# Patient Record
Sex: MALE | Race: WHITE | Employment: OTHER | URBAN - METROPOLITAN AREA
[De-identification: names, ages, dates, MRNs, and addresses within clinical notes are randomized per-mention and may not be internally consistent; named-entity substitution may affect disease eponyms.]

---

## 2017-01-01 ENCOUNTER — RADIANT APPOINTMENT (OUTPATIENT)
Dept: GENERAL RADIOLOGY | Facility: CLINIC | Age: 82
End: 2017-01-01
Attending: FAMILY MEDICINE
Payer: COMMERCIAL

## 2017-01-01 ENCOUNTER — OFFICE VISIT (OUTPATIENT)
Dept: URGENT CARE | Facility: URGENT CARE | Age: 82
End: 2017-01-01
Payer: COMMERCIAL

## 2017-01-01 VITALS
BODY MASS INDEX: 34.98 KG/M2 | HEART RATE: 81 BPM | SYSTOLIC BLOOD PRESSURE: 124 MMHG | OXYGEN SATURATION: 95 % | WEIGHT: 230 LBS | TEMPERATURE: 97.8 F | DIASTOLIC BLOOD PRESSURE: 88 MMHG

## 2017-01-01 DIAGNOSIS — T17.208A FOREIGN BODY IN PHARYNX, INITIAL ENCOUNTER: ICD-10-CM

## 2017-01-01 DIAGNOSIS — K52.9 GASTROENTERITIS: ICD-10-CM

## 2017-01-01 DIAGNOSIS — T17.208A FOREIGN BODY IN PHARYNX, INITIAL ENCOUNTER: Primary | ICD-10-CM

## 2017-01-01 PROCEDURE — 70360 X-RAY EXAM OF NECK: CPT

## 2017-01-01 PROCEDURE — 99213 OFFICE O/P EST LOW 20 MIN: CPT | Performed by: FAMILY MEDICINE

## 2017-01-01 PROCEDURE — 71020 XR CHEST 2 VW: CPT

## 2017-01-01 NOTE — NURSING NOTE
"Chief Complaint   Patient presents with     Throat Problem     pst calcium pill       Initial /88 mmHg  Pulse 81  Temp(Src) 97.8  F (36.6  C)  Wt 230 lb (104.327 kg)  SpO2 95% Estimated body mass index is 34.98 kg/(m^2) as calculated from the following:    Height as of 12/14/16: 5' 8\" (1.727 m).    Weight as of this encounter: 230 lb (104.327 kg).  BP completed using cuff size: regular    "

## 2017-01-01 NOTE — PROGRESS NOTES
"   SUBJECTIVE:  Chief Complaint   Patient presents with     Throat Problem      calcium pill thinks may have gotten \"stuck\"     Nausea     diarrhea     Martir Morris is a 89 year old male   with a chief complaint of feeling like a calcium pill is stuck in his  throat.  Onset of symptoms was 8 hours(s) ago.  He was drinking water without difficulty,  Ate spaghetti with meatballs at lunch with no difficulty swallowing.  Later in the afternoon he again felt like something was in his throat       Current and Associated symptoms:  Today he became nauseated and vomited 1 x ,  Then diarrhea 2x,  Treatment measures tried include None tried.  Predisposing factors include None.-  No new meds, no others with diarrhea illness, no travel or bad food    Past Medical History   Diagnosis Date     Hypertension        ALLERGIES:  Percocet      Current Outpatient Prescriptions on File Prior to Visit:  hyoscyamine (ANASPAZ/LEVSIN) 0.125 MG tablet Take 1-2 tablets (125-250 mcg) by mouth every 4 hours as needed for cramping   Calcium Carbonate-Vitamin D (CALCIUM + D PO)    POTASSIUM PO Take  by mouth.   CALCIUM PO Take  by mouth.   FUROSEMIDE PO Take  by mouth.   Cholecalciferol (VITAMIN D) 1000 UNITS capsule Take 1 capsule by mouth daily.   PLAVIX 75 MG OR TABS 1 TABLET DAILY   RANITIDINE 75 MG OR TABS 1 TABLET 1 TO 2 TIMES A DAY AS NEEDED   TOPROL XL OR 1 TABLET DAILY     No current facility-administered medications on file prior to visit.    Social History   Substance Use Topics     Smoking status: Former Smoker     Quit date: 09/11/1981     Smokeless tobacco: Not on file     Alcohol Use: No       No family history on file.      ROS:  INTEGUMENTARY/SKIN: NEGATIVE for worrisome rashes, moles or lesions  EYES: NEGATIVE for vision changes or irritation  RESP:NEGATIVE for significant cough or SOB  GI: NEGATIVE for nausea, abdominal pain, heartburn, or change in bowel habits    OBJECTIVE:   /88 mmHg  Pulse 81  Temp(Src) 97.8  F " (36.6  C)  Wt 230 lb (104.327 kg)  SpO2 95%  GENERAL APPEARANCE: healthy, alert and no distress,EYES: EOMI,  PERRL, conjunctiva clear,HENT: ear canals and TM's normal.  Nose normal.  Pharynx erythematous with some exudate noted.,NECK: supple, non-tender to palpation, no adenopathy noted,RESP: lungs clear to auscultation - no rales, rhonchi or wheezes,CV: regular rates and rhythm, normal S1 S2, no murmer noted,ABDOMEN:  soft, nontender, no HSM or masses and bowel sounds normal,SKIN: no suspicious lesions or rashes  X-ray--  No foreign body on neck or chest film- calcium pill should be visible    ASSESSMENT:  Foreign body in pharynx, initial encounter    No foreign body identified,  Likely he swallowed the pill and has a scratch in his pharynx that is causing persistent irritation  - XR Chest 2 Views; Future  - XR Neck Soft Tissue; Future    Gastroenteritis  Symptoms seem mild,  Encourage fluids,  rest

## 2017-12-05 ENCOUNTER — RADIANT APPOINTMENT (OUTPATIENT)
Dept: GENERAL RADIOLOGY | Facility: CLINIC | Age: 82
End: 2017-12-05
Attending: PHYSICIAN ASSISTANT
Payer: COMMERCIAL

## 2017-12-05 ENCOUNTER — OFFICE VISIT (OUTPATIENT)
Dept: URGENT CARE | Facility: URGENT CARE | Age: 82
End: 2017-12-05
Payer: COMMERCIAL

## 2017-12-05 VITALS
SYSTOLIC BLOOD PRESSURE: 125 MMHG | RESPIRATION RATE: 16 BRPM | TEMPERATURE: 97 F | BODY MASS INDEX: 35.28 KG/M2 | DIASTOLIC BLOOD PRESSURE: 75 MMHG | WEIGHT: 232 LBS | OXYGEN SATURATION: 94 % | HEART RATE: 82 BPM

## 2017-12-05 DIAGNOSIS — S99.922A TOE INJURY, LEFT, INITIAL ENCOUNTER: Primary | ICD-10-CM

## 2017-12-05 DIAGNOSIS — S99.922A TOE INJURY, LEFT, INITIAL ENCOUNTER: ICD-10-CM

## 2017-12-05 PROCEDURE — 99213 OFFICE O/P EST LOW 20 MIN: CPT | Performed by: PHYSICIAN ASSISTANT

## 2017-12-05 PROCEDURE — 73660 X-RAY EXAM OF TOE(S): CPT | Mod: LT

## 2017-12-05 RX ORDER — BUPROPION HYDROCHLORIDE 200 MG/1
200 TABLET, EXTENDED RELEASE ORAL DAILY
COMMUNITY

## 2017-12-05 RX ORDER — ATORVASTATIN CALCIUM 10 MG/1
10 TABLET, FILM COATED ORAL
COMMUNITY
Start: 2017-09-15

## 2017-12-05 RX ORDER — AMLODIPINE BESYLATE 5 MG/1
5 TABLET ORAL
COMMUNITY
Start: 2017-04-12 | End: 2019-06-07

## 2017-12-05 RX ORDER — METOPROLOL SUCCINATE 50 MG/1
TABLET, EXTENDED RELEASE ORAL
COMMUNITY
Start: 2017-06-15 | End: 2019-06-07

## 2017-12-05 NOTE — MR AVS SNAPSHOT
"              After Visit Summary   2017    Martir Morris    MRN: [id number]           Patient Information     Date Of Birth          1925        Visit Information        Provider Department      2017 8:10 PM Melany Liu PA-C [facility] [place] [facility]        Today's Diagnoses     Toe injury, left, initial encounter    -  1       Follow-ups after your visit        Who to contact     If you have questions or need follow up information about today's clinic visit or your schedule please contact [facility] directly at [phone].  Normal or non-critical lab and imaging results will be communicated to you by [employer], letter or phone within 4 business days after the clinic has received the results. If you do not hear from us within 7 days, please contact the clinic through [employer] or phone. If you have a critical or abnormal lab result, we will notify you by phone as soon as possible.  Submit refill requests through [employer] or call your pharmacy and they will forward the refill request to us. Please allow 3 business days for your refill to be completed.          Additional Information About Your Visit        MyChart Information     [employer] lets you send messages to your doctor, view your test results, renew your prescriptions, schedule appointments and more. To sign up, go to [email or web address] . Click on \"Log in\" on the left side of the screen, which will take you to the Welcome page. Then click on \"Sign up Now\" on the right side of the page.     You will be asked to enter the access code listed below, as well as some personal information. Please follow the directions to create your username and password.     Your access code is: [email or web address]  Expires: 3/7/2018  4:43 PM     Your access code will  in 90 days. If you need help or a new code, please call your [facility] clinic or [phone].        Care EveryWhere ID     This is your Care " EveryWhere ID. This could be used by other organizations to access your [facility] medical records  [id number]        Your Vitals Were     Pulse Temperature Respirations Pulse Oximetry BMI (Body Mass Index)       82 97  F (36.1  C) (Oral) 16 94% 35.28 kg/m2        Blood Pressure from Last 3 Encounters:   12/05/17 125/75   01/01/17 124/88   12/14/16 126/82    Weight from Last 3 Encounters:   12/05/17 232 lb (105.2 kg)   01/01/17 230 lb (104.3 kg)   12/14/16 241 lb 6.4 oz (109.5 kg)               Primary Care Provider Fax #    Physician No Ref-Primary [phone]       No address on file        Equal Access to Services     SANDIP SERRANO : Angel claroso Hawa, waaxda luqadaha, qaybta kaalmada adeegyada, ok adamson . So [facility] [phone].    ATENCIÓN: Si habla español, tiene a mcneil disposición servicios gratuitos de asistencia lingüística. Llame al [phone].    We comply with applicable federal civil rights laws and [place] laws. We do not discriminate on the basis of race, color, national origin, age, disability, sex, sexual orientation, or gender identity.            Thank you!     Thank you for choosing [facility]  for your care. Our goal is always to provide you with excellent care. Hearing back from our patients is one way we can continue to improve our services. Please take a few minutes to complete the written survey that you may receive in the mail after your visit with us. Thank you!             Your Updated Medication List - Protect others around you: Learn how to safely use, store and throw away your medicines at www.disposemymeds.org.          This list is accurate as of: 12/5/17 11:59 PM.  Always use your most recent med list.                   Brand Name Dispense Instructions for use Diagnosis    amLODIPine 5 MG tablet    NORVASC     Take 5 mg by mouth        atorvastatin 10 MG tablet    LIPITOR     Take 10 mg by mouth         buPROPion 200 MG 12 hr tablet    WELLBUTRIN SR     Take 200 mg by mouth        CALCIUM + D PO           CALCIUM PO      Take  by mouth.        FUROSEMIDE PO      Take  by mouth.        hyoscyamine 0.125 MG tablet    ANASPAZ/LEVSIN    15 tablet    Take 1-2 tablets (125-250 mcg) by mouth every 4 hours as needed for cramping    Abdominal pain, generalized       leuprolide 30 MG kit    LUPRON DEPOT     Inject 30 mg into the muscle        PLAVIX 75 MG tablet   Generic drug:  clopidogrel      1 TABLET DAILY        POTASSIUM PO      Take  by mouth.        * RANITIDINE 75 MG Tabs      1 TABLET 1 TO 2 TIMES A DAY AS NEEDED        * ranitidine 150 MG tablet    ZANTAC     Take 150 mg by mouth        * TOPROL XL PO      1 TABLET DAILY        * metoprolol 50 MG 24 hr tablet    TOPROL-XL     TAKE ONE TABLET BY MOUTH EVERY DAY        vitamin D 1000 UNITS capsule      Take 1 capsule by mouth daily.        * Notice:  This list has 4 medication(s) that are the same as other medications prescribed for you. Read the directions carefully, and ask your doctor or other care provider to review them with you.

## 2017-12-06 NOTE — NURSING NOTE
"Chief Complaint   Patient presents with     Urgent Care     pt states left foot toe nail x this morning        Initial /75 (BP Location: Left arm, Patient Position: Chair, Cuff Size: Adult Regular)  Pulse 82  Temp 97  F (36.1  C) (Oral)  Resp 16  Wt 232 lb (105.2 kg)  SpO2 94%  BMI 35.28 kg/m2 Estimated body mass index is 35.28 kg/(m^2) as calculated from the following:    Height as of 12/14/16: 5' 8\" (1.727 m).    Weight as of this encounter: 232 lb (105.2 kg).  Medication Reconciliation: complete      "

## 2017-12-07 NOTE — PROGRESS NOTES
SUBJECTIVE:  Chief Complaint   Patient presents with     Urgent Care     pt states left foot toe nail x this morning      Martir Morris is a 89 year old male presents with a chief complaint of left 3rd toe pain after stubbing toe today.  No other symptoms or injury.   :[id number]}.  He treated it initially with no therapy. This is the first time this type of injury has occurred to this patient.     Past Medical History:   Diagnosis Date     Hypertension      Patient Active Problem List   Diagnosis     Aftercare following joint replacement     Knee joint replacement by other means     Social History   Substance Use Topics     Smoking status: Former Smoker     Quit date: 9/11/1981     Smokeless tobacco: Not on file     Alcohol use No       ROS:  CONSTITUTIONAL:NEGATIVE for fever, chills, change in weight  INTEGUMENTARY/SKIN: NEGATIVE for worrisome rashes, moles or lesions  MUSCULOSKELETAL: as per HPI  NEURO: NEGATIVE for weakness, dizziness or paresthesias    EXAM:   /75 (BP Location: Left arm, Patient Position: Chair, Cuff Size: Adult Regular)  Pulse 82  Temp 97  F (36.1  C) (Oral)  Resp 16  Wt 232 lb (105.2 kg)  SpO2 94%  BMI 35.28 kg/m2  Gen: healthy,alert,no distress  Extremity: left 3rd toe: ecchymosis at base of toe with tenderness.  No open wounds or bony deformity.   SKIN: no rashes or lesions.    Toenails are thick and long.       XRAY of toe is negative for fracture as per my interpretation     (S99.925Y) Toe injury, left, initial encounter  (primary encounter diagnosis)  Comment:   Plan: XR Toe Left G/E 2 Views        Brandon tape toes 2 and 3 for a few days.  Ice, elevate.  Tylenol prn.     Advised f/u with podiatry to address long toenails.      Patient expresses understanding and agreement with the assessment and plan as above.

## 2018-08-14 ENCOUNTER — OFFICE VISIT (OUTPATIENT)
Dept: URGENT CARE | Facility: URGENT CARE | Age: 83
End: 2018-08-14
Payer: COMMERCIAL

## 2018-08-14 VITALS
RESPIRATION RATE: 14 BRPM | OXYGEN SATURATION: 95 % | BODY MASS INDEX: 34.53 KG/M2 | DIASTOLIC BLOOD PRESSURE: 82 MMHG | SYSTOLIC BLOOD PRESSURE: 130 MMHG | HEART RATE: 76 BPM | TEMPERATURE: 98.1 F | WEIGHT: 227.1 LBS

## 2018-08-14 DIAGNOSIS — H10.31 ACUTE BACTERIAL CONJUNCTIVITIS OF RIGHT EYE: Primary | ICD-10-CM

## 2018-08-14 PROCEDURE — 99213 OFFICE O/P EST LOW 20 MIN: CPT | Performed by: PHYSICIAN ASSISTANT

## 2018-08-14 RX ORDER — ERYTHROMYCIN 5 MG/G
OINTMENT OPHTHALMIC
Qty: 3.5 G | Refills: 0 | Status: SHIPPED | OUTPATIENT
Start: 2018-08-14 | End: 2019-06-07

## 2018-08-14 NOTE — PATIENT INSTRUCTIONS
(H10.31) Acute bacterial conjunctivitis of right eye  (primary encounter diagnosis)  Comment:   Plan: erythromycin (ROMYCIN) ophthalmic ointment  Use small amount (about 7mm in diameter) to each eye 3 times a day for 5 days        Warm compress to eyes prior to using ointment    Follow up with ophthalmology should symptoms persist or worsen.

## 2018-08-14 NOTE — MR AVS SNAPSHOT
After Visit Summary   8/14/2018    Martir Morris    MRN: [id number]           Patient Information     Date Of Birth          9/27/1925        Visit Information        Provider Department      8/14/2018 1:20 PM Melany Liu PA-C [facility]        Today's Diagnoses     Acute bacterial conjunctivitis of right eye    -  1      Care Instructions    (H10.31) Acute bacterial conjunctivitis of right eye  (primary encounter diagnosis)  Comment:   Plan: erythromycin (ROMYCIN) ophthalmic ointment  Use small amount (about 7mm in diameter) to each eye 3 times a day for 5 days        Warm compress to eyes prior to using ointment    Follow up with ophthalmology should symptoms persist or worsen.            Follow-ups after your visit        Who to contact     If you have questions or need follow up information about today's clinic visit or your schedule please contact [facility] directly at [phone].  Normal or non-critical lab and imaging results will be communicated to you by MyChart, letter or phone within 4 business days after the clinic has received the results. If you do not hear from us within 7 days, please contact the clinic through MyChart or phone. If you have a critical or abnormal lab result, we will notify you by phone as soon as possible.  Submit refill requests through [employer] or call your pharmacy and they will forward the refill request to us. Please allow 3 business days for your refill to be completed.          Additional Information About Your Visit        Care EveryWhere ID     This is your Care EveryWhere ID. This could be used by other organizations to access your [facility] medical records  [id number]        Your Vitals Were     Pulse Temperature Respirations Pulse Oximetry BMI (Body Mass Index)       76 98.1  F (36.7  C) (Oral) 14 95% 34.53 kg/m2        Blood Pressure from Last 3 Encounters:   08/14/18 130/82    12/05/17 125/75   01/01/17 124/88    Weight from Last 3 Encounters:   08/14/18 227 lb 1.6 oz (103 kg)   12/05/17 232 lb (105.2 kg)   01/01/17 230 lb (104.3 kg)              Today, you had the following     No orders found for display         Today's Medication Changes          These changes are accurate as of 8/14/18  3:01 PM.  If you have any questions, ask your nurse or doctor.               Start taking these medicines.        Dose/Directions    erythromycin ophthalmic ointment   Commonly known as:  ROMYCIN   Used for:  Acute bacterial conjunctivitis of right eye   Started by:  Melany Liu PA-C        Apply to affected eye(s) TID as directed for 5 days   Quantity:  3.5 g   Refills:  0            Where to get your medicines      These medications were sent to [facility] [address]  [address], [facility] [address]     Phone:  [phone]     erythromycin ophthalmic ointment                Primary Care Provider Fax #    Physician No Ref-Primary [phone]       No address on file        Equal Access to Services     WILLEM SERRANO : Hadii apple reynoso hadasho Soqamarali, waaxda luqadaha, qaybta kaalmada adekatieyada, ok adamson . So [facility] [phone].    ATENCIÓN: Si habla español, tiene a mcneil disposición servicios gratuitos de asistencia lingüística. Llame al [phone].    We comply with applicable federal civil rights laws and [place] laws. We do not discriminate on the basis of race, color, national origin, age, disability, sex, sexual orientation, or gender identity.            Thank you!     Thank you for choosing [facility]  for your care. Our goal is always to provide you with excellent care. Hearing back from our patients is one way we can continue to improve our services. Please take a few minutes to complete the written survey that you may receive in the mail after your visit with us.  Thank you!             Your Updated Medication List - Protect others around you: Learn how to safely use, store and throw away your medicines at www.disposemymeds.org.          This list is accurate as of 8/14/18  3:01 PM.  Always use your most recent med list.                   Brand Name Dispense Instructions for use Diagnosis    amLODIPine 5 MG tablet    NORVASC     Take 5 mg by mouth        atorvastatin 10 MG tablet    LIPITOR     Take 10 mg by mouth        buPROPion 200 MG 12 hr tablet    WELLBUTRIN SR     Take 200 mg by mouth        CALCIUM + D PO           CALCIUM PO      Take  by mouth.        erythromycin ophthalmic ointment    ROMYCIN    3.5 g    Apply to affected eye(s) TID as directed for 5 days    Acute bacterial conjunctivitis of right eye       FUROSEMIDE PO      Take  by mouth.        hyoscyamine 0.125 MG tablet    ANASPAZ/LEVSIN    15 tablet    Take 1-2 tablets (125-250 mcg) by mouth every 4 hours as needed for cramping    Abdominal pain, generalized       leuprolide 30 MG kit    LUPRON DEPOT     Inject 30 mg into the muscle        PLAVIX 75 MG tablet   Generic drug:  clopidogrel      1 TABLET DAILY        POTASSIUM PO      Take  by mouth.        * RANITIDINE 75 MG Tabs      1 TABLET 1 TO 2 TIMES A DAY AS NEEDED        * ranitidine 150 MG tablet    ZANTAC     Take 150 mg by mouth        * TOPROL XL PO      1 TABLET DAILY        * metoprolol succinate 50 MG 24 hr tablet    TOPROL-XL     TAKE ONE TABLET BY MOUTH EVERY DAY        vitamin D 1000 units capsule      Take 1 capsule by mouth daily.        * Notice:  This list has 4 medication(s) that are the same as other medications prescribed for you. Read the directions carefully, and ask your doctor or other care provider to review them with you.

## 2018-08-17 NOTE — PROGRESS NOTES
SUBJECTIVE:  Chief Complaint:   Chief Complaint   Patient presents with     Urgent Care     Eye Problem     Pt states right watery and red sxs x this morning      History of Present Illness:  Martir Morris is a 89 year old male who presents complaining of mild right eye discharge, redness since this morning.  Denies any trauma.  Denies any eye pain or decreased vision.  Eye has been goopy.  Left eye is starting to develop symptoms as well.   Denies any fevers or rashes,    He is otherwise in his usual state of health.      SH: patient is a retired       Past Medical History:   Diagnosis Date     Hypertension      Current Outpatient Prescriptions   Medication Sig Dispense Refill     atorvastatin (LIPITOR) 10 MG tablet Take 10 mg by mouth       buPROPion (WELLBUTRIN SR) 200 MG 12 hr tablet Take 200 mg by mouth       Calcium Carbonate-Vitamin D (CALCIUM + D PO)        CALCIUM PO Take  by mouth.       Cholecalciferol (VITAMIN D) 1000 UNITS capsule Take 1 capsule by mouth daily.       erythromycin (ROMYCIN) ophthalmic ointment Apply to affected eye(s) TID as directed for 5 days 3.5 g 0     FUROSEMIDE PO Take  by mouth.       hyoscyamine (ANASPAZ/LEVSIN) 0.125 MG tablet Take 1-2 tablets (125-250 mcg) by mouth every 4 hours as needed for cramping 15 tablet 1     metoprolol (TOPROL-XL) 50 MG 24 hr tablet TAKE ONE TABLET BY MOUTH EVERY DAY       PLAVIX 75 MG OR TABS 1 TABLET DAILY       POTASSIUM PO Take  by mouth.       ranitidine (ZANTAC) 150 MG tablet Take 150 mg by mouth       RANITIDINE 75 MG OR TABS 1 TABLET 1 TO 2 TIMES A DAY AS NEEDED       TOPROL XL OR 1 TABLET DAILY       amLODIPine (NORVASC) 5 MG tablet Take 5 mg by mouth       leuprolide (LUPRON DEPOT) 30 MG kit Inject 30 mg into the muscle          ROS:  CONSTITUTIONAL:NEGATIVE for fever, chills, change in weight  INTEGUMENTARY/SKIN: NEGATIVE for worrisome rashes, moles or lesions  EYES: as per HPI  ENT/MOUTH: NEGATIVE for ear, mouth and throat  problems  RESP:NEGATIVE for significant cough or SOB  CV: NEGATIVE for chest pain, palpitations or peripheral edema  Review of systems negative except as stated above.    OBJECTIVE:  /82  Pulse 76  Temp 98.1  F (36.7  C) (Oral)  Resp 14  Wt 227 lb 1.6 oz (103 kg)  SpO2 95%  BMI 34.53 kg/m2  General: no acute distress  Eye exam: right eye normal lid, cornea, pupil and fundus.  Conjunctiva is injected with yellow drainage.    left eye normal lid, conjunctiva, cornea, pupil and fundus.  Nose: NORMAL - no drainage, turbinates normal in size.  SKIN: no rashes or lesions.      (H10.31) Acute bacterial conjunctivitis of right eye  (primary encounter diagnosis)  Comment:   Plan: erythromycin (ROMYCIN) ophthalmic ointment  Use small amount (about 7mm in diameter) to each eye 3 times a day for 5 days        Warm compress to eyes prior to using ointment    Follow up with ophthalmology should symptoms persist or worsen.      Patient expresses understanding and agreement with the assessment and plan as above.

## 2019-06-07 ENCOUNTER — HOSPITAL ENCOUNTER (OUTPATIENT)
Facility: CLINIC | Age: 84
Setting detail: OBSERVATION
Discharge: HOME | End: 2019-06-08
Attending: EMERGENCY MEDICINE | Admitting: INTERNAL MEDICINE
Payer: MEDICARE

## 2019-06-07 DIAGNOSIS — R04.0 EPISTAXIS: Primary | ICD-10-CM

## 2019-06-07 PROBLEM — R53.1 WEAKNESS: Status: ACTIVE | Noted: 2019-06-07

## 2019-06-07 LAB
ABO + RH BLD: NORMAL
ABO + RH BLD: NORMAL
BASOPHILS # BLD AUTO: 0 10E9/L (ref 0–0.2)
BASOPHILS NFR BLD AUTO: 0.5 %
BLD GP AB SCN SERPL QL: NORMAL
BLOOD BANK CMNT PATIENT-IMP: NORMAL
DIFFERENTIAL METHOD BLD: ABNORMAL
EOSINOPHIL # BLD AUTO: 0.2 10E9/L (ref 0–0.7)
EOSINOPHIL NFR BLD AUTO: 2.6 %
ERYTHROCYTE [DISTWIDTH] IN BLOOD BY AUTOMATED COUNT: 13.4 % (ref 10–15)
HCT VFR BLD AUTO: 43.2 % (ref 40–53)
HGB BLD-MCNC: 14.5 G/DL (ref 13.3–17.7)
IMM GRANULOCYTES # BLD: 0 10E9/L (ref 0–0.4)
IMM GRANULOCYTES NFR BLD: 0.2 %
LYMPHOCYTES # BLD AUTO: 1.1 10E9/L (ref 0.8–5.3)
LYMPHOCYTES NFR BLD AUTO: 18.5 %
MCH RBC QN AUTO: 33.6 PG (ref 26.5–33)
MCHC RBC AUTO-ENTMCNC: 33.6 G/DL (ref 31.5–36.5)
MCV RBC AUTO: 100 FL (ref 78–100)
MONOCYTES # BLD AUTO: 0.7 10E9/L (ref 0–1.3)
MONOCYTES NFR BLD AUTO: 11.6 %
NEUTROPHILS # BLD AUTO: 3.8 10E9/L (ref 1.6–8.3)
NEUTROPHILS NFR BLD AUTO: 66.6 %
NRBC # BLD AUTO: 0 10*3/UL
NRBC BLD AUTO-RTO: 0 /100
PLATELET # BLD AUTO: 200 10E9/L (ref 150–450)
RBC # BLD AUTO: 4.32 10E12/L (ref 4.4–5.9)
SPECIMEN EXP DATE BLD: NORMAL
WBC # BLD AUTO: 5.7 10E9/L (ref 4–11)

## 2019-06-07 PROCEDURE — 99220 ZZC INITIAL OBSERVATION CARE,LEVL III: CPT | Performed by: INTERNAL MEDICINE

## 2019-06-07 PROCEDURE — 86901 BLOOD TYPING SEROLOGIC RH(D): CPT | Performed by: EMERGENCY MEDICINE

## 2019-06-07 PROCEDURE — G0378 HOSPITAL OBSERVATION PER HR: HCPCS

## 2019-06-07 PROCEDURE — 85025 COMPLETE CBC W/AUTO DIFF WBC: CPT | Performed by: EMERGENCY MEDICINE

## 2019-06-07 PROCEDURE — A9270 NON-COVERED ITEM OR SERVICE: HCPCS | Mod: GY | Performed by: INTERNAL MEDICINE

## 2019-06-07 PROCEDURE — 25800030 ZZH RX IP 258 OP 636: Performed by: INTERNAL MEDICINE

## 2019-06-07 PROCEDURE — 30903 CONTROL OF NOSEBLEED: CPT

## 2019-06-07 PROCEDURE — 86900 BLOOD TYPING SEROLOGIC ABO: CPT | Performed by: EMERGENCY MEDICINE

## 2019-06-07 PROCEDURE — 86850 RBC ANTIBODY SCREEN: CPT | Performed by: EMERGENCY MEDICINE

## 2019-06-07 PROCEDURE — 99285 EMERGENCY DEPT VISIT HI MDM: CPT | Mod: 25

## 2019-06-07 PROCEDURE — 25000132 ZZH RX MED GY IP 250 OP 250 PS 637: Mod: GY | Performed by: INTERNAL MEDICINE

## 2019-06-07 PROCEDURE — 85025 COMPLETE CBC W/AUTO DIFF WBC: CPT | Performed by: INTERNAL MEDICINE

## 2019-06-07 RX ORDER — ONDANSETRON 2 MG/ML
4 INJECTION INTRAMUSCULAR; INTRAVENOUS EVERY 6 HOURS PRN
Status: DISCONTINUED | OUTPATIENT
Start: 2019-06-07 | End: 2019-06-08 | Stop reason: HOSPADM

## 2019-06-07 RX ORDER — AMOXICILLIN 250 MG
1 CAPSULE ORAL 2 TIMES DAILY PRN
Status: DISCONTINUED | OUTPATIENT
Start: 2019-06-07 | End: 2019-06-08 | Stop reason: HOSPADM

## 2019-06-07 RX ORDER — ONDANSETRON 4 MG/1
4 TABLET, ORALLY DISINTEGRATING ORAL EVERY 6 HOURS PRN
Status: DISCONTINUED | OUTPATIENT
Start: 2019-06-07 | End: 2019-06-08 | Stop reason: HOSPADM

## 2019-06-07 RX ORDER — ACETAMINOPHEN 650 MG/1
650 SUPPOSITORY RECTAL EVERY 4 HOURS PRN
Status: DISCONTINUED | OUTPATIENT
Start: 2019-06-07 | End: 2019-06-08 | Stop reason: HOSPADM

## 2019-06-07 RX ORDER — BUPROPION HYDROCHLORIDE 200 MG/1
200 TABLET, EXTENDED RELEASE ORAL DAILY
Status: DISCONTINUED | OUTPATIENT
Start: 2019-06-08 | End: 2019-06-08 | Stop reason: HOSPADM

## 2019-06-07 RX ORDER — AMOXICILLIN 250 MG
2 CAPSULE ORAL 2 TIMES DAILY PRN
Status: DISCONTINUED | OUTPATIENT
Start: 2019-06-07 | End: 2019-06-08 | Stop reason: HOSPADM

## 2019-06-07 RX ORDER — FINASTERIDE 5 MG/1
5 TABLET, FILM COATED ORAL DAILY
Status: DISCONTINUED | OUTPATIENT
Start: 2019-06-08 | End: 2019-06-08 | Stop reason: HOSPADM

## 2019-06-07 RX ORDER — FINASTERIDE 5 MG/1
5 TABLET, FILM COATED ORAL DAILY
COMMUNITY

## 2019-06-07 RX ORDER — FERROUS SULFATE 325(65) MG
325 TABLET ORAL
COMMUNITY

## 2019-06-07 RX ORDER — PROCHLORPERAZINE 25 MG
12.5 SUPPOSITORY, RECTAL RECTAL EVERY 12 HOURS PRN
Status: DISCONTINUED | OUTPATIENT
Start: 2019-06-07 | End: 2019-06-08 | Stop reason: HOSPADM

## 2019-06-07 RX ORDER — SIMETHICONE 80 MG
80 TABLET,CHEWABLE ORAL 4 TIMES DAILY PRN
COMMUNITY

## 2019-06-07 RX ORDER — NALOXONE HYDROCHLORIDE 0.4 MG/ML
.1-.4 INJECTION, SOLUTION INTRAMUSCULAR; INTRAVENOUS; SUBCUTANEOUS
Status: DISCONTINUED | OUTPATIENT
Start: 2019-06-07 | End: 2019-06-08 | Stop reason: HOSPADM

## 2019-06-07 RX ORDER — ACETAMINOPHEN 325 MG/1
650 TABLET ORAL EVERY 4 HOURS PRN
Status: DISCONTINUED | OUTPATIENT
Start: 2019-06-07 | End: 2019-06-08 | Stop reason: HOSPADM

## 2019-06-07 RX ORDER — CLOPIDOGREL BISULFATE 75 MG/1
75 TABLET ORAL DAILY
Status: ON HOLD | COMMUNITY
End: 2019-06-08

## 2019-06-07 RX ORDER — METOPROLOL SUCCINATE 50 MG/1
50 TABLET, EXTENDED RELEASE ORAL DAILY
Status: DISCONTINUED | OUTPATIENT
Start: 2019-06-08 | End: 2019-06-08 | Stop reason: HOSPADM

## 2019-06-07 RX ORDER — FLUTICASONE PROPIONATE 50 MCG
1 SPRAY, SUSPENSION (ML) NASAL DAILY PRN
Status: ON HOLD | COMMUNITY
End: 2019-06-08

## 2019-06-07 RX ORDER — AMLODIPINE BESYLATE 5 MG/1
5 TABLET ORAL DAILY
Status: DISCONTINUED | OUTPATIENT
Start: 2019-06-08 | End: 2019-06-08 | Stop reason: HOSPADM

## 2019-06-07 RX ORDER — SODIUM CHLORIDE 9 MG/ML
INJECTION, SOLUTION INTRAVENOUS CONTINUOUS
Status: ACTIVE | OUTPATIENT
Start: 2019-06-07 | End: 2019-06-08

## 2019-06-07 RX ORDER — POLYETHYLENE GLYCOL 3350 17 G/17G
17 POWDER, FOR SOLUTION ORAL DAILY PRN
Status: DISCONTINUED | OUTPATIENT
Start: 2019-06-07 | End: 2019-06-08 | Stop reason: HOSPADM

## 2019-06-07 RX ORDER — AMLODIPINE BESYLATE 5 MG/1
5 TABLET ORAL DAILY
COMMUNITY

## 2019-06-07 RX ORDER — METOPROLOL SUCCINATE 50 MG/1
50 TABLET, EXTENDED RELEASE ORAL DAILY
COMMUNITY

## 2019-06-07 RX ORDER — PROCHLORPERAZINE MALEATE 5 MG
5 TABLET ORAL EVERY 6 HOURS PRN
Status: DISCONTINUED | OUTPATIENT
Start: 2019-06-07 | End: 2019-06-08 | Stop reason: HOSPADM

## 2019-06-07 RX ORDER — SODIUM CHLORIDE 9 MG/ML
INJECTION, SOLUTION INTRAVENOUS CONTINUOUS
Status: CANCELLED | OUTPATIENT
Start: 2019-06-07 | End: 2019-06-08

## 2019-06-07 RX ORDER — POTASSIUM CHLORIDE 750 MG/1
10 TABLET, EXTENDED RELEASE ORAL DAILY
COMMUNITY

## 2019-06-07 RX ADMIN — AMOXICILLIN AND CLAVULANATE POTASSIUM 1 TABLET: 875; 125 TABLET, FILM COATED ORAL at 23:04

## 2019-06-07 RX ADMIN — SODIUM CHLORIDE: 9 INJECTION, SOLUTION INTRAVENOUS at 22:19

## 2019-06-07 ASSESSMENT — MIFFLIN-ST. JEOR: SCORE: 1649.17

## 2019-06-07 ASSESSMENT — ENCOUNTER SYMPTOMS
WEAKNESS: 1
LIGHT-HEADEDNESS: 1

## 2019-06-07 NOTE — ED TRIAGE NOTES
Spontaneous nose bleed starting approx. 530pm. Pt reports having a nose bleed yesterday that he was able to control with afrin. Takes plavix.

## 2019-06-07 NOTE — ED NOTES
Bed: ED10  Expected date: 6/7/19  Expected time: 6:04 PM  Means of arrival: Ambulance  Comments:  533 89m nosebleed bleeding VSS

## 2019-06-08 ENCOUNTER — APPOINTMENT (OUTPATIENT)
Dept: PHYSICAL THERAPY | Facility: CLINIC | Age: 84
End: 2019-06-08
Attending: INTERNAL MEDICINE
Payer: MEDICARE

## 2019-06-08 VITALS
TEMPERATURE: 97.3 F | WEIGHT: 227 LBS | DIASTOLIC BLOOD PRESSURE: 77 MMHG | BODY MASS INDEX: 34.4 KG/M2 | RESPIRATION RATE: 16 BRPM | OXYGEN SATURATION: 93 % | SYSTOLIC BLOOD PRESSURE: 130 MMHG | HEART RATE: 72 BPM | HEIGHT: 68 IN

## 2019-06-08 LAB
ANION GAP SERPL CALCULATED.3IONS-SCNC: 5 MMOL/L (ref 3–14)
BASOPHILS # BLD AUTO: 0 10E9/L (ref 0–0.2)
BASOPHILS NFR BLD AUTO: 3 %
BUN SERPL-MCNC: 26 MG/DL (ref 7–30)
CALCIUM SERPL-MCNC: 9.1 MG/DL (ref 8.5–10.1)
CHLORIDE SERPL-SCNC: 109 MMOL/L (ref 94–109)
CO2 SERPL-SCNC: 27 MMOL/L (ref 20–32)
CREAT SERPL-MCNC: 1.16 MG/DL (ref 0.66–1.25)
DIFFERENTIAL METHOD BLD: ABNORMAL
EOSINOPHIL # BLD AUTO: 0.1 10E9/L (ref 0–0.7)
EOSINOPHIL NFR BLD AUTO: 2 %
ERYTHROCYTE [DISTWIDTH] IN BLOOD BY AUTOMATED COUNT: 13.5 % (ref 10–15)
GFR SERPL CREATININE-BSD FRML MDRD: 54 ML/MIN/{1.73_M2}
GLUCOSE SERPL-MCNC: 109 MG/DL (ref 70–99)
HCT VFR BLD AUTO: 41.1 % (ref 40–53)
HGB BLD-MCNC: 13.5 G/DL (ref 13.3–17.7)
HGB BLD-MCNC: 13.7 G/DL (ref 13.3–17.7)
LYMPHOCYTES # BLD AUTO: 1.2 10E9/L (ref 0.8–5.3)
LYMPHOCYTES NFR BLD AUTO: 20 %
MCH RBC QN AUTO: 33.2 PG (ref 26.5–33)
MCHC RBC AUTO-ENTMCNC: 32.8 G/DL (ref 31.5–36.5)
MCV RBC AUTO: 101 FL (ref 78–100)
MONOCYTES # BLD AUTO: 0.4 10E9/L (ref 0–1.3)
MONOCYTES NFR BLD AUTO: 7 %
NEUTROPHILS # BLD AUTO: 3.9 10E9/L (ref 1.6–8.3)
NEUTROPHILS NFR BLD AUTO: 68 %
NEUTS BAND # BLD AUTO: 0.2 10E9/L (ref 0–0.6)
NEUTS BAND NFR BLD MANUAL: 0 %
NRBC # BLD AUTO: 0.1 10*3/UL
NRBC BLD AUTO-RTO: 1 /100
PLATELET # BLD AUTO: 205 10E9/L (ref 150–450)
PLATELET # BLD EST: ABNORMAL 10*3/UL
POTASSIUM SERPL-SCNC: 4.6 MMOL/L (ref 3.4–5.3)
RBC # BLD AUTO: 4.07 10E12/L (ref 4.4–5.9)
RBC MORPH BLD: ABNORMAL
SODIUM SERPL-SCNC: 141 MMOL/L (ref 133–144)
WBC # BLD AUTO: 5.8 10E9/L (ref 4–11)

## 2019-06-08 PROCEDURE — 97161 PT EVAL LOW COMPLEX 20 MIN: CPT | Mod: GP

## 2019-06-08 PROCEDURE — 99217 ZZC OBSERVATION CARE DISCHARGE: CPT | Performed by: PHYSICIAN ASSISTANT

## 2019-06-08 PROCEDURE — A9270 NON-COVERED ITEM OR SERVICE: HCPCS | Mod: GY | Performed by: STUDENT IN AN ORGANIZED HEALTH CARE EDUCATION/TRAINING PROGRAM

## 2019-06-08 PROCEDURE — 80048 BASIC METABOLIC PNL TOTAL CA: CPT | Performed by: INTERNAL MEDICINE

## 2019-06-08 PROCEDURE — 25000132 ZZH RX MED GY IP 250 OP 250 PS 637: Mod: GY | Performed by: INTERNAL MEDICINE

## 2019-06-08 PROCEDURE — 85018 HEMOGLOBIN: CPT | Performed by: PHYSICIAN ASSISTANT

## 2019-06-08 PROCEDURE — 36415 COLL VENOUS BLD VENIPUNCTURE: CPT | Performed by: PHYSICIAN ASSISTANT

## 2019-06-08 PROCEDURE — 25000132 ZZH RX MED GY IP 250 OP 250 PS 637: Mod: GY | Performed by: STUDENT IN AN ORGANIZED HEALTH CARE EDUCATION/TRAINING PROGRAM

## 2019-06-08 PROCEDURE — 36415 COLL VENOUS BLD VENIPUNCTURE: CPT | Performed by: INTERNAL MEDICINE

## 2019-06-08 PROCEDURE — A9270 NON-COVERED ITEM OR SERVICE: HCPCS | Mod: GY | Performed by: INTERNAL MEDICINE

## 2019-06-08 PROCEDURE — G0378 HOSPITAL OBSERVATION PER HR: HCPCS

## 2019-06-08 RX ORDER — CALCIUM CARBONATE 500 MG/1
1000 TABLET, CHEWABLE ORAL EVERY 4 HOURS PRN
Status: DISCONTINUED | OUTPATIENT
Start: 2019-06-08 | End: 2019-06-08 | Stop reason: HOSPADM

## 2019-06-08 RX ORDER — DIAZEPAM 2 MG
2 TABLET ORAL
Status: DISCONTINUED | OUTPATIENT
Start: 2019-06-08 | End: 2019-06-08

## 2019-06-08 RX ORDER — LORAZEPAM 0.5 MG/1
0.5 TABLET ORAL ONCE
Status: COMPLETED | OUTPATIENT
Start: 2019-06-08 | End: 2019-06-08

## 2019-06-08 RX ADMIN — FINASTERIDE 5 MG: 5 TABLET, FILM COATED ORAL at 08:27

## 2019-06-08 RX ADMIN — AMOXICILLIN AND CLAVULANATE POTASSIUM 1 TABLET: 875; 125 TABLET, FILM COATED ORAL at 08:27

## 2019-06-08 RX ADMIN — CALCIUM CARBONATE (ANTACID) CHEW TAB 500 MG 1000 MG: 500 CHEW TAB at 00:36

## 2019-06-08 RX ADMIN — ACETAMINOPHEN 650 MG: 325 TABLET, FILM COATED ORAL at 08:27

## 2019-06-08 RX ADMIN — BUPROPION HYDROCHLORIDE 200 MG: 200 TABLET, FILM COATED, EXTENDED RELEASE ORAL at 08:27

## 2019-06-08 RX ADMIN — CALCIUM CARBONATE (ANTACID) CHEW TAB 500 MG 1000 MG: 500 CHEW TAB at 05:53

## 2019-06-08 RX ADMIN — AMLODIPINE BESYLATE 5 MG: 5 TABLET ORAL at 08:27

## 2019-06-08 RX ADMIN — LORAZEPAM 0.5 MG: 0.5 TABLET ORAL at 08:35

## 2019-06-08 RX ADMIN — METOPROLOL SUCCINATE 50 MG: 50 TABLET, EXTENDED RELEASE ORAL at 08:27

## 2019-06-08 NOTE — PLAN OF CARE
PT:  Discharge Planner PT   Patient plan for discharge: Return home  Current status: Orders received, eval completed. Pt admitted under observation status with a nosebleed. Prior to admit pt was living alone in a house, stays on one level, uses a cane when out but generally not in the home, has a ramp to enter. Pt has a cleaning person, son at times helps with things, has hired help for outside maintenance. Otherwise pt is independent with mobility and ADLs. Currently pt is independent with bed mobility and transfers, amb 150 ft with SBA and no AD with mild balance impairments, and another 250 ft with SEC with improved balance. Pt feels he is at baseline and denies dizziness or lightheadedness with any activity.   Barriers to return to prior living situation: None  Recommendations for discharge: Return home  Rationale for recommendations: Pt has no further skilled PT needs at this time, safe to return home alone. Pt states he may ask his son to stay until his ENT appointment in case he needs anything.       Entered by: Mallorie Landry 06/08/2019 2:59 PM

## 2019-06-08 NOTE — PLAN OF CARE
A&Ox4. VSS on RA. Reg diet. IV SL. Up SBA.  Rhino rocket L nostril intact. One time dose of PO Ativan given for anxiety. Pt calm and cooperative throughout shift. Hgb stable at 13.7.  SW and PT consulted. See notes. Pt discharged to home this afternoon. Discharge instructions and medication were provided and explained to the patient in detail. All questions were answered. Pt discharged with son.

## 2019-06-08 NOTE — PHARMACY-ADMISSION MEDICATION HISTORY
Admission Medication History    Admission medication history interview status for the 6/7/2019 admission is complete. See EPIC admission navigator for prior to admission medications     Medication history source reliability:Good, Patient had med list      Time spent in this activity: 15 minutes    Prior to Admission medications    Medication Sig Last Dose Taking? Auth Provider   amLODIPine (NORVASC) 5 MG tablet Take 5 mg by mouth daily 6/7/2019 at AM Yes Unknown, Entered By History   atorvastatin (LIPITOR) 10 MG tablet Take 10 mg by mouth 6/7/2019 at AM Yes Reported, Patient   buPROPion (WELLBUTRIN SR) 200 MG 12 hr tablet Take 200 mg by mouth 6/7/2019 at AM Yes Reported, Patient   Cholecalciferol (VITAMIN D) 1000 UNITS capsule Take 1 capsule by mouth daily. 6/7/2019 at AM Yes Reported, Patient   clopidogrel (PLAVIX) 75 MG tablet Take 75 mg by mouth daily 6/7/2019 at AM Yes Unknown, Entered By History   ferrous sulfate (FEROSUL) 325 (65 Fe) MG tablet Take 325 mg by mouth daily (with breakfast) 6/7/2019 at AM Yes Unknown, Entered By History   finasteride (PROSCAR) 5 MG tablet Take 5 mg by mouth daily 6/7/2019 at AM Yes Unknown, Entered By History   fluticasone (FLONASE) 50 MCG/ACT nasal spray Spray 1 spray into both nostrils daily as needed for rhinitis or allergies  Yes Unknown, Entered By History   leuprolide (LUPRON DEPOT) 30 MG kit Inject 30 mg into the muscle every 4 months Past six weeks Yes Unknown, Entered By History   metoprolol succinate ER (TOPROL-XL) 50 MG 24 hr tablet Take 50 mg by mouth daily 6/7/2019 at AM Yes Unknown, Entered By History   potassium chloride ER (K-DUR/KLOR-CON M) 10 MEQ CR tablet Take 10 mEq by mouth daily 6/7/2019 at AM Yes Unknown, Entered By History   ranitidine (ZANTAC) 150 MG tablet Take 150 mg by mouth daily as needed for heartburn   at PRN Yes Reported, Patient   simethicone (MYLICON) 80 MG chewable tablet Take 80 mg by mouth 4 times daily as needed for flatulence or cramping  at  PRN Yes Unknown, Entered By History       Pasha Mason, PharmD

## 2019-06-08 NOTE — PLAN OF CARE
OBS GOALS    -diagnostic tests and consults completed and resulted: NOT MET  -vital signs normal or at patient baseline: MET

## 2019-06-08 NOTE — PROGRESS NOTES
Alert and oriented. IV fluids discontinued. Rhino rocket L nostril intact. Occasional blood dripping from nose. Ativan given 0830. Tylenol for headache at 0830. Outpatient PT scheduled.   Noted trace lower leg edema. Given antibiotics to take at home.

## 2019-06-08 NOTE — H&P
Admitted:     06/07/2019      PRIMARY CARE PROVIDER:  Dr. Roland, [facility].      CHIEF COMPLAINT:  Nosebleed.      HISTORY OF PRESENT ILLNESS:  Mr. Martir Morris is a 89-year-old gentleman with history including hypertension, hyperlipidemia, and depression/anxiety, who presents with a nosebleed.  The patient noted a significant nosebleed coming from his left naris.  For 30 minutes he had uncontrolled bleeding and at that point he called paramedics.  They put a nasal clamp and he was brought to [facility] for further evaluation.      The patient seen in the ER by Dr. Blair.  Vital signs showed temperature 97.8, heart rate 92, blood pressure 169/96, respiration rate 16, O2 saturation 94% on room air.  He had a CBC checked that showed normal hemoglobin of 14.5.  He underwent treatment with Afrin spray as well as lidocaine with epinephrine and Silver nitrate.  Still some bleeding and packing was placed with a 7.5 cm anterior Rapid Rhino.  After that the bleeding improved.  However, when they reevaluated the patient, he felt a bit unsteady and felt uncomfortable going home.  As such, request for observation admission was made.      The patient denies any chest pain.  Did have shortness of breath with the nosebleed, but appears to be better.  No fevers or chills.  No abdominal pain, bloody stools or nausea or vomiting.      PAST MEDICAL HISTORY:   1.  Hypertension.   2.  Hyperlipidemia.   3.  Prostate cancer with metastatic disease to the mid-upper spine.  Diagnosed in 2006.  He has had treatment including radiation therapy.  Disease has been stable since that time.   4.  TIA history.  History of bilateral MCA stenosis noted.   5.  Obesity.   6.  GERD.   7.  Depression/anxiety.  Follows with Dr. Contreras in the [facility].  8.  Diastolic dysfunction.  Echocardiogram from 01/2019 showed left ventricular EF of 60% with grade I diastolic dysfunction.   9.  Vitamin D  deficiency.   10.  History of vitamin B12 deficiency.     PAST SURGICAL HISTORY:   1.  Status post bilateral inguinal hernia repair in childhood.   2.  Status post TURP.   3.  Status post right hernia repair 1964.   4.  Status post hemorrhoid surgery in 1980.   5.  Status post bilateral tendon surgeries, 1970 and 1991.   6.  Status post cataract surgery 2012.   7.  Status post left total knee arthroplasty in 2013.     Medications Prior to Admission   Medication Sig Dispense Refill Last Dose     amLODIPine (NORVASC) 5 MG tablet Take 5 mg by mouth daily   6/7/2019 at AM     atorvastatin (LIPITOR) 10 MG tablet Take 10 mg by mouth   6/7/2019 at AM     buPROPion (WELLBUTRIN SR) 200 MG 12 hr tablet Take 200 mg by mouth   6/7/2019 at AM     Cholecalciferol (VITAMIN D) 1000 UNITS capsule Take 1 capsule by mouth daily.   6/7/2019 at AM     clopidogrel (PLAVIX) 75 MG tablet Take 75 mg by mouth daily   6/7/2019 at AM     ferrous sulfate (FEROSUL) 325 (65 Fe) MG tablet Take 325 mg by mouth daily (with breakfast)   6/7/2019 at AM     finasteride (PROSCAR) 5 MG tablet Take 5 mg by mouth daily   6/7/2019 at AM     fluticasone (FLONASE) 50 MCG/ACT nasal spray Spray 1 spray into both nostrils daily as needed for rhinitis or allergies        leuprolide (LUPRON DEPOT) 30 MG kit Inject 30 mg into the muscle every 4 months   Past six weeks     metoprolol succinate ER (TOPROL-XL) 50 MG 24 hr tablet Take 50 mg by mouth daily   6/7/2019 at AM     potassium chloride ER (K-DUR/KLOR-CON M) 10 MEQ CR tablet Take 10 mEq by mouth daily   6/7/2019 at AM     ranitidine (ZANTAC) 150 MG tablet Take 150 mg by mouth daily as needed for heartburn     at PRN     simethicone (MYLICON) 80 MG chewable tablet Take 80 mg by mouth 4 times daily as needed for flatulence or cramping    at PRN     ALLERGIES:  HORSE DERIVED PRODUCTS, PERCOCET, ANTI-TETANUS.      HOME MEDICATIONS:   Prior to Admission medications    Medication Sig Last Dose Taking? Auth Provider    amLODIPine (NORVASC) 5 MG tablet Take 5 mg by mouth daily 6/7/2019 at AM Yes Unknown, Entered By History   atorvastatin (LIPITOR) 10 MG tablet Take 10 mg by mouth 6/7/2019 at AM Yes Reported, Patient   buPROPion (WELLBUTRIN SR) 200 MG 12 hr tablet Take 200 mg by mouth 6/7/2019 at AM Yes Reported, Patient   Cholecalciferol (VITAMIN D) 1000 UNITS capsule Take 1 capsule by mouth daily. 6/7/2019 at AM Yes Reported, Patient   clopidogrel (PLAVIX) 75 MG tablet Take 75 mg by mouth daily 6/7/2019 at AM Yes Unknown, Entered By History   ferrous sulfate (FEROSUL) 325 (65 Fe) MG tablet Take 325 mg by mouth daily (with breakfast) 6/7/2019 at AM Yes Unknown, Entered By History   finasteride (PROSCAR) 5 MG tablet Take 5 mg by mouth daily 6/7/2019 at AM Yes Unknown, Entered By History   fluticasone (FLONASE) 50 MCG/ACT nasal spray Spray 1 spray into both nostrils daily as needed for rhinitis or allergies  Yes Unknown, Entered By History   leuprolide (LUPRON DEPOT) 30 MG kit Inject 30 mg into the muscle every 4 months Past six weeks Yes Unknown, Entered By History   metoprolol succinate ER (TOPROL-XL) 50 MG 24 hr tablet Take 50 mg by mouth daily 6/7/2019 at AM Yes Unknown, Entered By History   potassium chloride ER (K-DUR/KLOR-CON M) 10 MEQ CR tablet Take 10 mEq by mouth daily 6/7/2019 at AM Yes Unknown, Entered By History   ranitidine (ZANTAC) 150 MG tablet Take 150 mg by mouth daily as needed for heartburn   at PRN Yes Reported, Patient   simethicone (MYLICON) 80 MG chewable tablet Take 80 mg by mouth 4 times daily as needed for flatulence or cramping  at PRN Yes Unknown, Entered By History      SOCIAL HISTORY:  The patient does not smoke or drink.  He is .  He had 2 children, but only his son remaining.  He is retired.  He was a  in high school.  He currently lives alone.      FAMILY HISTORY:  Reviewed and not felt contributory.      REVIEW OF SYSTEMS:  As noted in HPI, otherwise 10-point review of  systems negative.      PHYSICAL EXAMINATION:   VITAL SIGNS:  Temperature 97.8, heart rate 92, blood pressure 169/96, respiration rate 16, O2 saturation 94% on room air.   GENERAL:  Alert and oriented, pleasant 89-year-old male patient who appears comfortable lying in bed.  Conversant and friendly.  He has nasal packing in place.   HEENT:  Pupils equal, round, reactive.  There is no scleral icterus or conjunctival injection.  Nose has nasal packing in place.  Oropharynx:  Some signs of blood in the oropharynx.   NECK:  No bruits, JVD or adenopathy.   HEART:  Regular rate and rhythm without murmurs, rubs, or gallops.   LUNGS:  Diminished in bases without crackles or wheezes.   ABDOMEN:  Soft, nontender, nondistended, positive bowel sounds.   EXTREMITIES:  Show bilateral 1+ pitting edema (patient says that this is stable).   NEUROLOGIC:  Exam reveals no gross focal motor or sensory deficits.      LABORATORY DATA:  CBC showed white count 5.7, hemoglobin 14.5, platelets 200.      ASSESSMENT AND PLAN:  Mr. Martir Morris is a 89-year-old male patient with history including hypertension, diastolic CHF, hyperlipidemia, TIA and obesity, who presents with a nosebleed.      1.  Epistaxis.  This was treated with epinephrine and lidocaine as well as eventually packed with a Rhino Rocket.  Discussed with Dr. Blair, ER doctor.  Hold Plavix for now.  Start Augmentin until packing removed.  Will need to leave the packing in place until followup in the [facility] on 06/10/2019.  I have written the discharge followup order and we will ask social work to see if we could help schedule this appointment.       2.  Unsteadiness, suspect due to acute blood loss from the above.  Hemoglobin was normal, but he did have a profuse amount of bleeding according to the ER doctor.  Order normal saline at 75 mL an hour for 10 hours.  Reassess in the morning.      3.  Benign essential hypertension.  Continue amlodipine and metoprolol.      4.   Hyperlipidemia.  Resume statin at home.     5.  Prostate cancer.  Continue finasteride.  Resume leuprolide outpatient.    6.  Depression/anxiety.  Continue bupropion.     7.  TIA history.  Hold Plavix for now as above; likely hold for several days after discharge given nosebleed.     8.  Prophylaxis.  Pneumoboots and ambulation.      CODE STATUS:  Discussed with patient, he is DNR/DNI.         CHRISTIAN UPTON JR., MD             D: 2019   T: 2019   MT: SANDIE      Name:     HARRISON CADENA   MRN:      [id number]        Account:      [id number]   :      1925        Admitted:     2019                   Document: [id number]       cc: Manjeet Roland MD

## 2019-06-08 NOTE — PROGRESS NOTES
.RECEIVING UNIT ED HANDOFF REVIEW    ED Nurse Handoff Report was reviewed by: Sarkis Campbell on June 7, 2019 at 9:35 PM

## 2019-06-08 NOTE — PROVIDER NOTIFICATION
Per bedside RN, pt anxious.  Valium ordered initially but per pharmacist, would not recommend per pt's age.  Requested ativan.  See order.

## 2019-06-08 NOTE — PROGRESS NOTES
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 Pt still drives and does his own errands and grocery shopping. Mostly uses the microwae for cooking.   Functional Level Prior   Ambulation 1-->assistive equipment   Transferring 1-->assistive equipment   Fall history within last six months yes   Number of times patient has fallen within last six months 1   Which of the above functional risks had a recent onset or change? none   General Information   Onset of Illness/Injury or Date of Surgery - Date 06/07/19   Referring Physician Denver Garcia MD   Patient/Family Goals Statement Return home   Pertinent History of Current Problem (include personal factors and/or comorbidities that impact the POC) Pt admitted under observation status with a nosebleed. PMH: HTN, depression, anxiety, TIA, prostate cancer.   Precautions/Limitations fall precautions   Weight-Bearing Status - LLE full weight-bearing   Weight-Bearing Status - RLE full weight-bearing   Cognitive Status Examination   Orientation orientation to person, place and time   Level of Consciousness alert   Follows Commands and Answers Questions 100% of the time;able to follow single-step instructions   Personal Safety and Judgment intact   Pain Assessment   Patient Currently in Pain No   Posture    Posture Forward head position;Protracted shoulders   Range of Motion (ROM)   ROM Quick Adds No deficits were identified   Strength   Strength Comments B hip flex 4+/5, knee ext 5/5, DF 5/5   Bed Mobility   Bed Mobility Comments Supine to sit independent   Transfer  "Skills   Transfer Comments Sit to stand independent   Gait   Gait Comments Pt amb 150 ft with no AD and SBA, mild balance impairments. Pt then amb 250 ft wtih SEC with improved balance. Pt states he mostly does not use the cane in the house but always does when going out.   Balance   Balance Comments Mild balance impairments noted without AD use but is safe. Feels he is at baseline.   General Therapy Interventions   Intervention Comments None needed   Clinical Impression   Criteria for Skilled Therapeutic Intervention evaluation only   Clinical Presentation Stable/Uncomplicated   Clinical Presentation Rationale medically stable   Clinical Decision Making (Complexity) Low complexity   Predicted Duration of Therapy Intervention (days/wks) eval only   Anticipated Discharge Disposition Home   Risk & Benefits of therapy have been explained Yes   Patient, Family & other staff in agreement with plan of care Yes   [facility] [employer] \"6 Clicks\"   2016, Trustees of [facility], under license to [employer].  All rights reserved.   6 Clicks Short Forms Basic Mobility Inpatient Short Form   [facility] AM-PAC  \"6 Clicks\" V.2 Basic Mobility Inpatient Short Form   1. Turning from your back to your side while in a flat bed without using bedrails? 4 - None   2. Moving from lying on your back to sitting on the side of a flat bed without using bedrails? 4 - None   3. Moving to and from a bed to a chair (including a wheelchair)? 4 - None   4. Standing up from a chair using your arms (e.g., wheelchair, or bedside chair)? 4 - None   5. To walk in hospital room? 4 - None   6. Climbing 3-5 steps with a railing? 3 - A Little   Basic Mobility Raw Score (Score out of 24.Lower scores equate to lower levels of function) 23   Total Evaluation Time   Total Evaluation Time (Minutes) 35     "

## 2019-06-08 NOTE — PROGRESS NOTES
SW: SW:  acknowledges consult. Awaiting Physical Therapy recommendations. SW follow and assist with discharge needs.    JDOI Linton  Daytime (8:00am-4:30pm): [phone]  After-Hours SW Pager (4:30pm-11:30pm): [phone]

## 2019-06-08 NOTE — PLAN OF CARE
A/O, VSS ex htn, SBA cane, nose clamp, scant nose bleed when sitting up, denies lightheadedness. PT and SW consulted.

## 2019-06-08 NOTE — ED NOTES
"[facility]  ED Nurse Handoff Report    ED Chief complaint: Epistaxis      ED Diagnosis:   Final diagnoses:   Epistaxis       Code Status: Full Code    Allergies:   Allergies   Allergen Reactions     Horse-Derived Products      No Clinical Screening - See Comments Other (See Comments)     Horse Serum Products - Anti Tetnus     Percocet [Oxycodone-Acetaminophen] Other (See Comments)     n & v       Activity level - Baseline/Home:  Independent  Activity Level - Current:   Stand with Assist    Patient's Preferred language: english   Needed?: No    Isolation: No  Infection: Not Applicable  Bariatric?: No    Vital Signs:   Vitals:    06/07/19 1820   BP: (!) 169/96   Pulse: 92   Resp: 16   Temp: 97.8  F (36.6  C)   TempSrc: Oral   SpO2: 94%   Weight: 103 kg (227 lb)   Height: 1.727 m (5' 8\")       Cardiac Rhythm: ,        Pain level:      Is this patient confused?: No   Does this patient have a guardian?  No         If yes, is there guardianship documents in the Epic \"Code/ACP\" activity?  N/A         Guardian Notified?  N/A  [place] - Suicide Severity Rating Scale Completed?  Yes  If yes, what color did the patient score?  White    Patient Report: Initial Complaint: nosebleed  Focused Assessment: same  Tests Performed: labs  Abnormal Results:   Labs Ordered and Resulted from Time of ED Arrival Up to the Time of Departure from the ED   CBC WITH PLATELETS DIFFERENTIAL - Abnormal; Notable for the following components:       Result Value    RBC Count 4.32 (*)     MCH 33.6 (*)     All other components within normal limits   ABO/RH TYPE AND SCREEN       Treatments provided: meal    Family Comments: call brother scout for any needs, [phone], [phone]    OBS brochure/video discussed/provided to patient/family: Yes              Name of person given brochure if not patient: na              Relationship to patient: na    ED Medications: Medications - No data to display    Drips infusing?:  " No    For the majority of the shift this patient was Green.   Interventions performed were n.    Severe Sepsis OR Septic Shock Diagnosis Present: No    To be done/followed up on inpatient unit:  na    ED NURSE PHONE NUMBER: [phone]

## 2019-06-08 NOTE — PLAN OF CARE
A&Ox4. VSS on RA, ex htn. Reg diet. IVF infusing. Up SBA.  Rhino rocket L nostril intact. Voiding adequately w/o difficulty. Tums for heartburn (see EMAR). 1x dose ativan ordered for anxiety, but pt sleeping by time med available and declined need when next up to bathroom. Hgb: 13.5. AM labs pending. SW and PT consults. Continue to monitor.

## 2019-07-20 ENCOUNTER — OFFICE VISIT (OUTPATIENT)
Dept: URGENT CARE | Facility: URGENT CARE | Age: 84
End: 2019-07-20
Payer: MEDICARE

## 2019-07-20 VITALS
SYSTOLIC BLOOD PRESSURE: 136 MMHG | BODY MASS INDEX: 35.16 KG/M2 | DIASTOLIC BLOOD PRESSURE: 82 MMHG | TEMPERATURE: 98 F | RESPIRATION RATE: 16 BRPM | OXYGEN SATURATION: 97 % | HEIGHT: 67 IN | HEART RATE: 80 BPM | WEIGHT: 224 LBS

## 2019-07-20 DIAGNOSIS — J32.0 LEFT MAXILLARY SINUSITIS: Primary | ICD-10-CM

## 2019-07-20 PROCEDURE — 99213 OFFICE O/P EST LOW 20 MIN: CPT | Performed by: FAMILY MEDICINE

## 2019-07-20 ASSESSMENT — MIFFLIN-ST. JEOR: SCORE: 1619.69

## 2019-07-20 NOTE — PROGRESS NOTES
SUBJECTIVE:   Martir Morris is a 89 year old male presenting with a chief complaint of left facial pain.  Had mild headache.  Denies any fever.  Minimal congestion.  No rash, sore throat, ear pain or tooth pain.  Onset of symptoms was 2 day(s) ago.  Course of illness is worsening.    Severity moderate  Current and Associated symptoms: left face with swelling and tenderness  Treatment measures tried include Fluids, Rest and tylenol.  Predisposing factors include None.    Patient was seen in ER due to nosebleed on .  Patient had rhino-rocket placed and started on Augmentin for 1 week.  Patient states that did well after removal of rhino-rocket    PCP- [facility]    Past Medical History:   Diagnosis Date     Hypertension      Current Outpatient Medications   Medication Sig Dispense Refill     amLODIPine (NORVASC) 5 MG tablet Take 5 mg by mouth daily       atorvastatin (LIPITOR) 10 MG tablet Take 10 mg by mouth       buPROPion (WELLBUTRIN SR) 200 MG 12 hr tablet Take 200 mg by mouth daily        Cholecalciferol (VITAMIN D) 1000 UNITS capsule Take 1 capsule by mouth daily.       ferrous sulfate (FEROSUL) 325 (65 Fe) MG tablet Take 325 mg by mouth daily (with breakfast)       finasteride (PROSCAR) 5 MG tablet Take 5 mg by mouth daily       leuprolide (LUPRON DEPOT) 30 MG kit Inject 30 mg into the muscle every 4 months       metoprolol succinate ER (TOPROL-XL) 50 MG 24 hr tablet Take 50 mg by mouth daily       potassium chloride ER (K-DUR/KLOR-CON M) 10 MEQ CR tablet Take 10 mEq by mouth daily       ranitidine (ZANTAC) 150 MG tablet Take 150 mg by mouth daily as needed for heartburn        simethicone (MYLICON) 80 MG chewable tablet Take 80 mg by mouth 4 times daily as needed for flatulence or cramping       Social History     Tobacco Use     Smoking status: Former Smoker     Last attempt to quit: 1981     Years since quittin.8     Smokeless tobacco: Never Used   Substance Use Topics     Alcohol use: No  "      ROS:  Review of systems negative except as stated above.    OBJECTIVE:  /82   Pulse 80   Temp 98  F (36.7  C) (Oral)   Resp 16   Ht 1.702 m (5' 7\")   Wt 101.6 kg (224 lb)   SpO2 97%   BMI 35.08 kg/m    GENERAL APPEARANCE: healthy, alert and no distress  EYES: EOMI,  PERRL, conjunctiva clear  HENT: ear canals and TM's normal.  Nose and mouth without ulcers, erythema or lesions.  Mild discomfort on left maxillary sinus, slight fullness on cheek.  Tongue midline, no facial droop  RESP: lungs clear to auscultation - no rales, rhonchi or wheezes  CV: regular rates and rhythm, normal S1 S2, no murmur noted    ASSESSMENT/PLAN:  (J32.0) Left maxillary sinusitis  (primary encounter diagnosis)  Plan: amoxicillin-clavulanate (AUGMENTIN) 875-125 MG         tablet            Reassurance given, reviewed that most likely has an early sinus infection.  Due to recent nosebleed with rhino-rocket last month and underlying dental decay, will empirically cover for potential sinus infection.  RX Augmentin given for 10 day treatment.  Encourage to warm pack area and tylenol for discomfort.    Follow up with primary provider for recheck in 2-3 days.    Nick Nuñez MD  July 20, 2019 11:19 AM              "

## 2019-07-20 NOTE — PATIENT INSTRUCTIONS
Okay to take tylenol 500 mg - 2 tablets (1000 mg) every 6-8 hours as needed, do not exceed 3000 mg in 24 hours.  Take full course of antibiotic - Augmentin for sinus infection/facial infection.      Patient Education     Sinusitis (Antibiotic Treatment)    The sinuses are air-filled spaces within the bones of the face. They connect to the inside of the nose. Sinusitis is an inflammation of the tissue that lines the sinuses. Sinusitis can occur during a cold. It can also happen due to allergies to pollens and other particles in the air. Sinusitis can cause symptoms of sinus congestion and a feeling of fullness. A sinus infection causes fever, headache, and facial pain. There is often green or yellow fluid draining from the nose or into the back of the throat (post-nasal drip). You have been given antibiotics to treat this condition.  Home care    Take the full course of antibiotics as instructed. Do not stop taking them, even when you feel better.    Drink plenty of water, hot tea, and other liquids. This may help thin nasal mucus. It also may help your sinuses drain fluids.    Heat may help soothe painful areas of your face. Use a towel soaked in hot water. Or,  the shower and direct the warm spray onto your face. Using a vaporizer along with a menthol rub at night may also help soothe symptoms.     An expectorant with guaifenesin may help thin nasal mucus and help your sinuses drain fluids.    You can use an over-the-counter decongestant, unless a similar medicine was prescribed to you. Nasal sprays work the fastest. Use one that contains phenylephrine or oxymetazoline. First blow your nose gently. Then use the spray. Do not use these medicines more often than directed on the label. If you do, your symptoms may get worse. You may also take pills that contain pseudoephedrine. Don t use products that combine multiple medicines. This is because side effects may be increased. Read labels. You can also ask the  pharmacist for help. (People with high blood pressure should not use decongestants. They can raise blood pressure.)    Over-the-counter antihistamines may help if allergies contributed to your sinusitis.      Do not use nasal rinses or irrigation during an acute sinus infection, unless your healthcare provider tells you to. Rinsing may spread the infection to other areas in your sinuses.    Use acetaminophen or ibuprofen to control pain, unless another pain medicine was prescribed to you. If you have chronic liver or kidney disease or ever had a stomach ulcer, talk with your healthcare provider before using these medicines. (Aspirin should never be taken by anyone under age 18 who is ill with a fever. It may cause severe liver damage.)    Don't smoke. This can make symptoms worse.  Follow-up care  Follow up with your healthcare provider or our staff if you are not better in 1 week.  When to seek medical advice  Call your healthcare provider if any of these occur:    Facial pain or headache that gets worse    Stiff neck    Unusual drowsiness or confusion    Swelling of your forehead or eyelids    Vision problems, such as blurred or double vision    Fever of 100.4 F (38 C) or higher, or as directed by your healthcare provider    Seizure    Breathing problems    Symptoms don't go away in 10 days  Prevention  Here are steps you can take to help prevent an infection:    Keep good hand washing habits.    Don t have close contact with people who have sore throats, colds, or other upper respiratory infections.    Don t smoke, and stay away from secondhand smoke.    Stay up to date with of your vaccines.  Date Last Reviewed: 11/1/2017 2000-2018 [employer]. [address] [place] [address], [place] [address]. All rights reserved. This information is not intended as a substitute for professional medical care. Always follow your healthcare professional's instructions.

## 2019-10-13 ENCOUNTER — OFFICE VISIT (OUTPATIENT)
Dept: URGENT CARE | Facility: URGENT CARE | Age: 84
End: 2019-10-13
Payer: MEDICARE

## 2019-10-13 VITALS
BODY MASS INDEX: 36.22 KG/M2 | WEIGHT: 231.25 LBS | HEART RATE: 85 BPM | TEMPERATURE: 97.5 F | DIASTOLIC BLOOD PRESSURE: 90 MMHG | SYSTOLIC BLOOD PRESSURE: 142 MMHG

## 2019-10-13 DIAGNOSIS — K04.7 DENTAL ABSCESS: Primary | ICD-10-CM

## 2019-10-13 PROCEDURE — 99213 OFFICE O/P EST LOW 20 MIN: CPT | Performed by: FAMILY MEDICINE

## 2019-10-13 NOTE — PROGRESS NOTES
Subjective     Martir Morris is a 89 year old male who presents to clinic today for the following health issues:    HPI   Left upper jaw swelling, for the past  Few days.  No pain and no fever. History of teeth abscess in the past  Has not seen a dentist for many years.      Patient Active Problem List   Diagnosis     Aftercare following joint replacement     Knee joint replacement by other means     Weakness     No past surgical history on file.    Social History     Tobacco Use     Smoking status: Former Smoker     Last attempt to quit: 1981     Years since quittin.1     Smokeless tobacco: Never Used   Substance Use Topics     Alcohol use: No     No family history on file.      Current Outpatient Medications   Medication Sig Dispense Refill     amLODIPine (NORVASC) 5 MG tablet Take 5 mg by mouth daily       amoxicillin-clavulanate (AUGMENTIN) 875-125 MG tablet Take 1 tablet by mouth 2 times daily for 10 days 20 tablet 0     atorvastatin (LIPITOR) 10 MG tablet Take 10 mg by mouth       buPROPion (WELLBUTRIN SR) 200 MG 12 hr tablet Take 200 mg by mouth daily        Cholecalciferol (VITAMIN D) 1000 UNITS capsule Take 1 capsule by mouth daily.       ferrous sulfate (FEROSUL) 325 (65 Fe) MG tablet Take 325 mg by mouth daily (with breakfast)       finasteride (PROSCAR) 5 MG tablet Take 5 mg by mouth daily       leuprolide (LUPRON DEPOT) 30 MG kit Inject 30 mg into the muscle every 4 months       metoprolol succinate ER (TOPROL-XL) 50 MG 24 hr tablet Take 50 mg by mouth daily       potassium chloride ER (K-DUR/KLOR-CON M) 10 MEQ CR tablet Take 10 mEq by mouth daily       ranitidine (ZANTAC) 150 MG tablet Take 150 mg by mouth daily as needed for heartburn        simethicone (MYLICON) 80 MG chewable tablet Take 80 mg by mouth 4 times daily as needed for flatulence or cramping       Allergies   Allergen Reactions     Horse-Derived Products      No Clinical Screening - See Comments Other (See Comments)     Horse  Serum Products - Anti Tetnus     Percocet [Oxycodone-Acetaminophen] Other (See Comments)     n & v       Reviewed and updated as needed this visit by Provider         Review of Systems   ROS COMP: Constitutional, HEENT, cardiovascular, pulmonary, gi and gu systems are negative, except as otherwise noted.      Objective    BP (!) 142/90   Pulse 85   Temp 97.5  F (36.4  C) (Oral)   Wt 104.9 kg (231 lb 4 oz)   BMI 36.22 kg/m    Body mass index is 36.22 kg/m .  Physical Exam   GENERAL: healthy, alert and no distress  NECK: no adenopathy, no asymmetry, masses, or scars and thyroid normal to palpation  Left jaw swelling  Upper let teeth caries  a few broken teeth.    Diagnostic Test Results:  Labs reviewed in Epic  none         Assessment & Plan       ICD-10-CM    1. Dental abscess K04.7 amoxicillin-clavulanate (AUGMENTIN) 875-125 MG tablet   gargle with water and salt.  Follow up with a dentist.    See Patient Instructions    Return if symptoms worsen or fail to improve.    Jessenia Gill MD  [facility]

## 2019-10-13 NOTE — PATIENT INSTRUCTIONS
Follow up with Dentist    Patient Education     Dental Abscess  An abscess is a sac of pus. A dental abscess forms when a tooth or the tissue around it becomes infected with bacteria. The bacteria can enter through a cavity or a crack in a tooth. It can also infect the gum tissue or bone around a tooth. An untreated abscess can cause the loss of the tooth. It can even spread to other parts of the body and become life-threatening.    Symptoms of a dental abscess   Signs of a dental abscess include:    Toothache, often severe    Tooth pain with hot, cold, or pressure    Pain in the gums, cheek, or jaw    Bad breath or bitter taste in the mouth    Trouble swallowing or opening the mouth    Fever    Swollen or enlarged glands in the neck  Diagnosing a dental abscess  An abscess is diagnosed by looking at your teeth and gums. You will be told if any tests are needed, such as dental X-rays.  Treating a dental abscess  Treatments for a dental abscess may include the following:    Antibiotic medicines. These treat the underlying infection.    Pain relievers. These help you feel more comfortable. Your healthcare provider may prescribe a medicine for you. Or you may use over-the-counter pain relievers, such as acetaminophen or ibuprofen.    Warm saltwater rinses. These can soothe discomfort and help clear away pus.    Root canal surgery.  This may be done if needed to save the tooth. With a root canal, the infected part of the tooth is removed. A special substance is then used to fill the empty space in the tooth.    Draining the abscess. This may be doneif needed. Incisions are made to allow the infected material to drain from the tooth.    Removing the tooth. This is done in cases of severe infection that can t be treated another way.  You may need to be admitted to a hospital if the infection is severe, has spread, or doesn t respond to treatment.     When to call the dentist  Call your dentist right away if you have any  of the following:    Fever of 100.4 F (38 C) or higher    Increased pain, redness, drainage, or swelling in the treated area    Swelling of the face or jawbone    Pain that can't be controlled with medicines   Preventing dental abscess  To prevent another abscess in the future, keep your teeth clean and healthy. Brush twice a day and floss at least once daily. See your dentist for regular tooth cleanings. And stay away from sugary foods and drinks that can lead to tooth decay.  Date Last Reviewed: 6/1/2017 2000-2018 [employer]. [address] [place] [address]. All rights reserved. This information is not intended as a substitute for professional medical care. Always follow your healthcare professional's instructions.

## 2020-03-02 ENCOUNTER — RECORDS - HEALTHEAST (OUTPATIENT)
Dept: LAB | Facility: CLINIC | Age: 85
End: 2020-03-02

## 2020-03-02 LAB
ANION GAP SERPL CALCULATED.3IONS-SCNC: 9 MMOL/L (ref 5–18)
BUN SERPL-MCNC: 23 MG/DL (ref 8–28)
CALCIUM SERPL-MCNC: 8.7 MG/DL (ref 8.5–10.5)
CHLORIDE BLD-SCNC: 103 MMOL/L (ref 98–107)
CO2 SERPL-SCNC: 26 MMOL/L (ref 22–31)
CREAT SERPL-MCNC: 1.33 MG/DL (ref 0.7–1.3)
ERYTHROCYTE [DISTWIDTH] IN BLOOD BY AUTOMATED COUNT: 13.9 % (ref 11–14.5)
GFR SERPL CREATININE-BSD FRML MDRD: 50 ML/MIN/1.73M2
GLUCOSE BLD-MCNC: 117 MG/DL (ref 70–125)
HCT VFR BLD AUTO: 41.7 % (ref 40–54)
HGB BLD-MCNC: 13.3 G/DL (ref 14–18)
MCH RBC QN AUTO: 33.1 PG (ref 27–34)
MCHC RBC AUTO-ENTMCNC: 31.9 G/DL (ref 32–36)
MCV RBC AUTO: 104 FL (ref 80–100)
PLATELET # BLD AUTO: 168 THOU/UL (ref 140–440)
PMV BLD AUTO: 10.1 FL (ref 8.5–12.5)
POTASSIUM BLD-SCNC: 4.1 MMOL/L (ref 3.5–5)
RBC # BLD AUTO: 4.02 MILL/UL (ref 4.4–6.2)
SODIUM SERPL-SCNC: 138 MMOL/L (ref 136–145)
TSH SERPL DL<=0.005 MIU/L-ACNC: 2.09 UIU/ML (ref 0.3–5)
WBC: 5.1 THOU/UL (ref 4–11)

## 2020-03-16 ENCOUNTER — RECORDS - HEALTHEAST (OUTPATIENT)
Dept: LAB | Facility: CLINIC | Age: 85
End: 2020-03-16

## 2020-03-16 LAB
ANION GAP SERPL CALCULATED.3IONS-SCNC: 8 MMOL/L (ref 5–18)
BASOPHILS # BLD AUTO: 0.1 THOU/UL (ref 0–0.2)
BASOPHILS NFR BLD AUTO: 1 % (ref 0–2)
BUN SERPL-MCNC: 22 MG/DL (ref 8–28)
CALCIUM SERPL-MCNC: 8.6 MG/DL (ref 8.5–10.5)
CHLORIDE BLD-SCNC: 100 MMOL/L (ref 98–107)
CO2 SERPL-SCNC: 28 MMOL/L (ref 22–31)
CREAT SERPL-MCNC: 1.17 MG/DL (ref 0.7–1.3)
EOSINOPHIL # BLD AUTO: 0.2 THOU/UL (ref 0–0.4)
EOSINOPHIL NFR BLD AUTO: 4 % (ref 0–6)
ERYTHROCYTE [DISTWIDTH] IN BLOOD BY AUTOMATED COUNT: 14.2 % (ref 11–14.5)
GFR SERPL CREATININE-BSD FRML MDRD: 58 ML/MIN/1.73M2
GLUCOSE BLD-MCNC: 98 MG/DL (ref 70–125)
HCT VFR BLD AUTO: 41.3 % (ref 40–54)
HGB BLD-MCNC: 13.1 G/DL (ref 14–18)
LYMPHOCYTES # BLD AUTO: 0.8 THOU/UL (ref 0.8–4.4)
LYMPHOCYTES NFR BLD AUTO: 19 % (ref 20–40)
MCH RBC QN AUTO: 32.6 PG (ref 27–34)
MCHC RBC AUTO-ENTMCNC: 31.7 G/DL (ref 32–36)
MCV RBC AUTO: 103 FL (ref 80–100)
MONOCYTES # BLD AUTO: 0.6 THOU/UL (ref 0–0.9)
MONOCYTES NFR BLD AUTO: 15 % (ref 2–10)
NEUTROPHILS # BLD AUTO: 2.4 THOU/UL (ref 2–7.7)
NEUTROPHILS NFR BLD AUTO: 60 % (ref 50–70)
PLATELET # BLD AUTO: 250 THOU/UL (ref 140–440)
PMV BLD AUTO: 10.1 FL (ref 8.5–12.5)
POTASSIUM BLD-SCNC: 3.7 MMOL/L (ref 3.5–5)
RBC # BLD AUTO: 4.02 MILL/UL (ref 4.4–6.2)
SODIUM SERPL-SCNC: 136 MMOL/L (ref 136–145)
WBC: 4.1 THOU/UL (ref 4–11)

## 2020-03-17 ENCOUNTER — RECORDS - HEALTHEAST (OUTPATIENT)
Dept: LAB | Facility: CLINIC | Age: 85
End: 2020-03-17

## 2020-03-17 LAB
C DIFF TOX B STL QL: NEGATIVE
RIBOTYPE 027/NAP1/BI: NORMAL

## 2020-06-08 ENCOUNTER — RECORDS - HEALTHEAST (OUTPATIENT)
Dept: LAB | Facility: CLINIC | Age: 85
End: 2020-06-08

## 2020-06-08 LAB
ANION GAP SERPL CALCULATED.3IONS-SCNC: 8 MMOL/L (ref 5–18)
BUN SERPL-MCNC: 22 MG/DL (ref 8–28)
CALCIUM SERPL-MCNC: 8.4 MG/DL (ref 8.5–10.5)
CHLORIDE BLD-SCNC: 103 MMOL/L (ref 98–107)
CO2 SERPL-SCNC: 29 MMOL/L (ref 22–31)
CREAT SERPL-MCNC: 1 MG/DL (ref 0.7–1.3)
GFR SERPL CREATININE-BSD FRML MDRD: >60 ML/MIN/1.73M2
GLUCOSE BLD-MCNC: 91 MG/DL (ref 70–125)
HGB BLD-MCNC: 11.3 G/DL (ref 14–18)
POTASSIUM BLD-SCNC: 3.9 MMOL/L (ref 3.5–5)
SODIUM SERPL-SCNC: 140 MMOL/L (ref 136–145)

## 2020-07-13 ENCOUNTER — RECORDS - HEALTHEAST (OUTPATIENT)
Dept: LAB | Facility: CLINIC | Age: 85
End: 2020-07-13

## 2020-07-13 LAB
HGB BLD-MCNC: 12.4 G/DL (ref 14–18)
VIT B12 SERPL-MCNC: 221 PG/ML (ref 213–816)

## 2020-08-04 ENCOUNTER — RECORDS - HEALTHEAST (OUTPATIENT)
Dept: LAB | Facility: CLINIC | Age: 85
End: 2020-08-04

## 2020-08-05 LAB
ANION GAP SERPL CALCULATED.3IONS-SCNC: 7 MMOL/L (ref 5–18)
BUN SERPL-MCNC: 29 MG/DL (ref 8–28)
CALCIUM SERPL-MCNC: 8.7 MG/DL (ref 8.5–10.5)
CHLORIDE BLD-SCNC: 103 MMOL/L (ref 98–107)
CO2 SERPL-SCNC: 29 MMOL/L (ref 22–31)
CREAT SERPL-MCNC: 1.3 MG/DL (ref 0.7–1.3)
GFR SERPL CREATININE-BSD FRML MDRD: 51 ML/MIN/1.73M2
GLUCOSE BLD-MCNC: 78 MG/DL (ref 70–125)
POTASSIUM BLD-SCNC: 4.1 MMOL/L (ref 3.5–5)
SODIUM SERPL-SCNC: 139 MMOL/L (ref 136–145)

## 2020-09-21 ENCOUNTER — RECORDS - HEALTHEAST (OUTPATIENT)
Dept: LAB | Facility: CLINIC | Age: 85
End: 2020-09-21

## 2020-09-22 LAB — VIT B12 SERPL-MCNC: 797 PG/ML (ref 213–816)

## 2020-09-25 LAB
ANION GAP SERPL CALCULATED.3IONS-SCNC: 5 MMOL/L (ref 5–18)
BUN SERPL-MCNC: 27 MG/DL (ref 8–28)
CALCIUM SERPL-MCNC: 8.6 MG/DL (ref 8.5–10.5)
CHLORIDE BLD-SCNC: 105 MMOL/L (ref 98–107)
CO2 SERPL-SCNC: 30 MMOL/L (ref 22–31)
CREAT SERPL-MCNC: 1.21 MG/DL (ref 0.7–1.3)
GFR SERPL CREATININE-BSD FRML MDRD: 56 ML/MIN/1.73M2
GLUCOSE BLD-MCNC: 79 MG/DL (ref 70–125)
POTASSIUM BLD-SCNC: 4.4 MMOL/L (ref 3.5–5)
SODIUM SERPL-SCNC: 140 MMOL/L (ref 136–145)

## 2021-05-04 ENCOUNTER — RECORDS - HEALTHEAST (OUTPATIENT)
Dept: LAB | Facility: CLINIC | Age: 86
End: 2021-05-04

## 2021-05-04 LAB
ANION GAP SERPL CALCULATED.3IONS-SCNC: 11 MMOL/L (ref 5–18)
BUN SERPL-MCNC: 33 MG/DL (ref 8–28)
CALCIUM SERPL-MCNC: 8 MG/DL (ref 8.5–10.5)
CHLORIDE BLD-SCNC: 103 MMOL/L (ref 98–107)
CO2 SERPL-SCNC: 28 MMOL/L (ref 22–31)
CREAT SERPL-MCNC: 1.55 MG/DL (ref 0.7–1.3)
ERYTHROCYTE [DISTWIDTH] IN BLOOD BY AUTOMATED COUNT: 13.2 % (ref 11–14.5)
GFR SERPL CREATININE-BSD FRML MDRD: 42 ML/MIN/1.73M2
GLUCOSE BLD-MCNC: 88 MG/DL (ref 70–125)
HCT VFR BLD AUTO: 41.3 % (ref 40–54)
HGB BLD-MCNC: 13.1 G/DL (ref 14–18)
MAGNESIUM SERPL-MCNC: 2.2 MG/DL (ref 1.8–2.6)
MCH RBC QN AUTO: 31.9 PG (ref 27–34)
MCHC RBC AUTO-ENTMCNC: 31.7 G/DL (ref 32–36)
MCV RBC AUTO: 101 FL (ref 80–100)
PLATELET # BLD AUTO: 230 THOU/UL (ref 140–440)
PMV BLD AUTO: 10 FL (ref 8.5–12.5)
POTASSIUM BLD-SCNC: 4.2 MMOL/L (ref 3.5–5)
RBC # BLD AUTO: 4.11 MILL/UL (ref 4.4–6.2)
SODIUM SERPL-SCNC: 142 MMOL/L (ref 136–145)
WBC: 5.9 THOU/UL (ref 4–11)

## 2021-05-17 ENCOUNTER — RECORDS - HEALTHEAST (OUTPATIENT)
Dept: LAB | Facility: CLINIC | Age: 86
End: 2021-05-17

## 2021-05-18 LAB
ANION GAP SERPL CALCULATED.3IONS-SCNC: 9 MMOL/L (ref 5–18)
BUN SERPL-MCNC: 31 MG/DL (ref 8–28)
CALCIUM SERPL-MCNC: 8.3 MG/DL (ref 8.5–10.5)
CHLORIDE BLD-SCNC: 103 MMOL/L (ref 98–107)
CO2 SERPL-SCNC: 27 MMOL/L (ref 22–31)
CREAT SERPL-MCNC: 1.53 MG/DL (ref 0.7–1.3)
GFR SERPL CREATININE-BSD FRML MDRD: 43 ML/MIN/1.73M2
GLUCOSE BLD-MCNC: 77 MG/DL (ref 70–125)
POTASSIUM BLD-SCNC: 4.6 MMOL/L (ref 3.5–5)
SODIUM SERPL-SCNC: 139 MMOL/L (ref 136–145)

## 2021-11-01 ENCOUNTER — DOCUMENTATION ONLY (OUTPATIENT)
Dept: OTHER | Facility: CLINIC | Age: 86
End: 2021-11-01

## 2021-11-13 ENCOUNTER — LAB REQUISITION (OUTPATIENT)
Dept: LAB | Facility: CLINIC | Age: 86
End: 2021-11-13
Payer: MEDICARE

## 2021-11-13 DIAGNOSIS — N18.9 CHRONIC KIDNEY DISEASE, UNSPECIFIED: ICD-10-CM

## 2021-11-16 LAB
ANION GAP SERPL CALCULATED.3IONS-SCNC: 6 MMOL/L (ref 5–18)
BUN SERPL-MCNC: 22 MG/DL (ref 8–28)
CALCIUM SERPL-MCNC: 8.3 MG/DL (ref 8.5–10.5)
CHLORIDE BLD-SCNC: 105 MMOL/L (ref 98–107)
CO2 SERPL-SCNC: 30 MMOL/L (ref 22–31)
CREAT SERPL-MCNC: 1.27 MG/DL (ref 0.7–1.3)
GFR SERPL CREATININE-BSD FRML MDRD: 47 ML/MIN/1.73M2
GLUCOSE BLD-MCNC: 121 MG/DL (ref 70–125)
POTASSIUM BLD-SCNC: 3.7 MMOL/L (ref 3.5–5)
SODIUM SERPL-SCNC: 141 MMOL/L (ref 136–145)

## 2021-11-16 PROCEDURE — 80048 BASIC METABOLIC PNL TOTAL CA: CPT | Mod: ORL | Performed by: NURSE PRACTITIONER

## 2021-11-16 PROCEDURE — 36415 COLL VENOUS BLD VENIPUNCTURE: CPT | Mod: ORL | Performed by: NURSE PRACTITIONER

## 2021-11-16 PROCEDURE — P9604 ONE-WAY ALLOW PRORATED TRIP: HCPCS | Mod: ORL | Performed by: NURSE PRACTITIONER

## 2022-05-02 ENCOUNTER — LAB REQUISITION (OUTPATIENT)
Dept: LAB | Facility: CLINIC | Age: 87
End: 2022-05-02
Payer: MEDICARE

## 2022-05-02 DIAGNOSIS — D51.9 VITAMIN B12 DEFICIENCY ANEMIA, UNSPECIFIED: ICD-10-CM

## 2022-05-02 DIAGNOSIS — N18.9 CHRONIC KIDNEY DISEASE, UNSPECIFIED: ICD-10-CM

## 2022-05-07 ENCOUNTER — LAB REQUISITION (OUTPATIENT)
Dept: LAB | Facility: CLINIC | Age: 87
End: 2022-05-07
Payer: MEDICARE

## 2022-05-07 DIAGNOSIS — N18.9 CHRONIC KIDNEY DISEASE, UNSPECIFIED: ICD-10-CM

## 2022-05-07 DIAGNOSIS — D51.9 VITAMIN B12 DEFICIENCY ANEMIA, UNSPECIFIED: ICD-10-CM

## 2022-05-10 LAB
ANION GAP SERPL CALCULATED.3IONS-SCNC: 9 MMOL/L (ref 5–18)
BUN SERPL-MCNC: 30 MG/DL (ref 8–28)
CALCIUM SERPL-MCNC: 8.6 MG/DL (ref 8.5–10.5)
CHLORIDE BLD-SCNC: 101 MMOL/L (ref 98–107)
CO2 SERPL-SCNC: 27 MMOL/L (ref 22–31)
CREAT SERPL-MCNC: 1.28 MG/DL (ref 0.7–1.3)
ERYTHROCYTE [DISTWIDTH] IN BLOOD BY AUTOMATED COUNT: 14.9 % (ref 10–15)
GFR SERPL CREATININE-BSD FRML MDRD: 51 ML/MIN/1.73M2
GLUCOSE BLD-MCNC: 94 MG/DL (ref 70–125)
HCT VFR BLD AUTO: 35.8 % (ref 40–53)
HGB BLD-MCNC: 10.9 G/DL (ref 13.3–17.7)
MCH RBC QN AUTO: 27.8 PG (ref 26.5–33)
MCHC RBC AUTO-ENTMCNC: 30.4 G/DL (ref 31.5–36.5)
MCV RBC AUTO: 91 FL (ref 78–100)
PLATELET # BLD AUTO: 250 10E3/UL (ref 150–450)
POTASSIUM BLD-SCNC: 3.9 MMOL/L (ref 3.5–5)
RBC # BLD AUTO: 3.92 10E6/UL (ref 4.4–5.9)
SODIUM SERPL-SCNC: 137 MMOL/L (ref 136–145)
TSH SERPL DL<=0.005 MIU/L-ACNC: 2.42 UIU/ML (ref 0.3–5)
WBC # BLD AUTO: 4.9 10E3/UL (ref 4–11)

## 2022-05-10 PROCEDURE — 36415 COLL VENOUS BLD VENIPUNCTURE: CPT | Mod: ORL | Performed by: NURSE PRACTITIONER

## 2022-05-10 PROCEDURE — 84443 ASSAY THYROID STIM HORMONE: CPT | Mod: ORL | Performed by: NURSE PRACTITIONER

## 2022-05-10 PROCEDURE — 80048 BASIC METABOLIC PNL TOTAL CA: CPT | Mod: ORL | Performed by: NURSE PRACTITIONER

## 2022-05-10 PROCEDURE — P9604 ONE-WAY ALLOW PRORATED TRIP: HCPCS | Mod: ORL | Performed by: NURSE PRACTITIONER

## 2022-05-10 PROCEDURE — 85027 COMPLETE CBC AUTOMATED: CPT | Mod: ORL | Performed by: NURSE PRACTITIONER

## 2022-05-15 ENCOUNTER — LAB REQUISITION (OUTPATIENT)
Dept: LAB | Facility: CLINIC | Age: 87
End: 2022-05-15
Payer: MEDICARE

## 2022-05-15 DIAGNOSIS — D53.9 NUTRITIONAL ANEMIA, UNSPECIFIED: ICD-10-CM

## 2022-05-17 LAB
FERRITIN SERPL-MCNC: 17 NG/ML (ref 27–300)
FOLATE SERPL-MCNC: >20 NG/ML
HGB BLD-MCNC: 10.6 G/DL (ref 13.3–17.7)
IRON SATN MFR SERPL: 11 % (ref 15–46)
IRON SERPL-MCNC: 39 UG/DL (ref 35–180)
TIBC SERPL-MCNC: 348 UG/DL (ref 240–430)
TRANSFERRIN SERPL-MCNC: 278 MG/DL (ref 212–360)
VIT B12 SERPL-MCNC: >2000 PG/ML (ref 213–816)

## 2022-05-17 PROCEDURE — 36415 COLL VENOUS BLD VENIPUNCTURE: CPT | Mod: ORL | Performed by: NURSE PRACTITIONER

## 2022-05-17 PROCEDURE — 82746 ASSAY OF FOLIC ACID SERUM: CPT | Mod: ORL | Performed by: NURSE PRACTITIONER

## 2022-05-17 PROCEDURE — 83550 IRON BINDING TEST: CPT | Mod: ORL | Performed by: NURSE PRACTITIONER

## 2022-05-17 PROCEDURE — 82607 VITAMIN B-12: CPT | Mod: ORL | Performed by: NURSE PRACTITIONER

## 2022-05-17 PROCEDURE — 84466 ASSAY OF TRANSFERRIN: CPT | Mod: ORL | Performed by: NURSE PRACTITIONER

## 2022-05-17 PROCEDURE — 82728 ASSAY OF FERRITIN: CPT | Mod: ORL | Performed by: NURSE PRACTITIONER

## 2022-05-17 PROCEDURE — P9604 ONE-WAY ALLOW PRORATED TRIP: HCPCS | Mod: ORL | Performed by: NURSE PRACTITIONER

## 2022-05-17 PROCEDURE — 85018 HEMOGLOBIN: CPT | Mod: ORL | Performed by: NURSE PRACTITIONER

## 2022-05-25 NOTE — ED PROVIDER NOTES
"  History     Chief Complaint:  Epistaxis     The history is provided by the patient.      Martir Morris is a 89 year old male who presents via EMS with thirty minutes of uncontrollable epistaxis from his left nares. Nasal clamp placed twenty minutes prior to arrival by medics but bleeding has not stopped since. He affirms similar episode yesterday that resolved on its own. He denies any syncope or Coumadin use. He feels slightly weak. He lives alone. He is on plavix, but no anticoagulation.    Allergies:  Percocet    Medications:    Norvasc  Lipitor  Wellbutrin  Plavix  Toprol-XL    Past Medical History:    Hypertension  CHD  CKD III  Vertigo  Obesity  Osteoarthritis  GERD  Prostate cancer  TIA    Past Surgical History:    Bilateral inguinal hernia repair  TURP  Hemorrhoidectomy   Total knee arthroplasty    Family History:    No past pertinent family history.    Social History:  Smoking Status: Former Smoker  Smokeless Tobacco: Never Used  Alcohol Use: Negative  Marital Status:       Review of Systems   HENT: Positive for nosebleeds.    Neurological: Positive for weakness and light-headedness. Negative for syncope.   All other systems reviewed and are negative.    Physical Exam     Patient Vitals for the past 24 hrs:   BP Temp Temp src Pulse Resp SpO2 Height Weight   06/07/19 1820 (!) 169/96 97.8  F (36.6  C) Oral 92 16 94 % 1.727 m (5' 8\") 103 kg (227 lb)     Physical Exam   General:  Sitting on bed  HENT:  No obvious trauma to head  Right Ear:  External ear normal.   Left Ear:  External ear normal.   Nose:  Nose normal. Active epistaxis from the left nares from the anterior septum around Kiesselbach's plexus. No bleeding in the right nares.   Eyes:  Conjunctivae and EOM are normal. Pupils are equal, round, and reactive.   Neck: Normal range of motion. Neck supple. No tracheal deviation present.   CV:  Normal heart sounds. No murmur heard.  Pulm/Chest: Effort normal and breath sounds normal.   Abd: Soft. " No distension. There is no tenderness. There is no rigidity, no rebound and no guarding.   M/S: Normal range of motion.   Neuro: Alert. GCS 15.  Skin: Skin is warm and dry. No rash noted. Not diaphoretic.   Psych: Normal mood and affect. Behavior is normal.     Emergency Department Course     Laboratory:   CBC: WBC 5.7, HGB 14.5,   ABO/Rh type and screen: O neg    Procedures:  Physician: Hilario Blair DO    Procedure:  Nosebleed management    The patient's affected nares was prepped with atomized 2.5 mL 1% Lidocaine with Epi and Afrin nasal spray. Silver nitrate was also placed. These interventions did not control the bleeding.  Because of the severity of bleeding, a packing would need to be inserted.  Epistaxis persisted despite the aforementioned medications so patient's left nare was cauterized with silver nitrate. Bleeding continued so packing was required.     The nose was packed with a 7.5cm anterior Rapid Rhino, and the bleeding was controlled.  After a period of observation, the patient was reassessed and no further bleeding is noted.      There were no complications to the procedure.    Emergency Department Course:  1819 Nursing notes and vitals reviewed. I performed an exam of the patient as documented above.   1848 I resolved patient's epistaxis as outlined above.   1931 Patient rechecked and updated. No bleeding noted on the gauze in his nose.   2022 Patient rechecked and updated. Plan for admission. No blood in the posterior oropharynx or blood noted on the gauze.   2026 I spoke with Dr. Garcia of the hospitalist service regarding patient's presentation, findings, and plan of care.  I personally reviewed the laboratory results with the patient and answered all related questions prior to admission in stable condition.    Impression & Plan      Medical Decision Making:  Martir Morris is a very pleasant 89 year old male who presents for evaluation of epistaxis. He is on Plavix. The bleeding was  controlled with interventions in the ED. There are no signs of coagulopathy causing the bleeding or a general medical condition (thrombocytopenia, DIC, leukemia, etc) causing the bleeding today other then being on the antiplatelet. The left nare treated as noted above.  Afrin and lidocaine 1% with epinephrine were atomized into the nares per the procedure note above.  This did not control the bleeding.  Silver Nitrate was used without success.  The patient did have what appeared to be a moderate amount of blood loss from the nose.  Because the bleeding persisted despite all these interventions, packing was then required.  Rapid Rhino was then used to stop the bleeding, see above procedure note. The bleeding stopped and did not restart here in ED. Patient states he was uncomfortable going home with the amount of blood that he lost and weakness (he also mentioned that he lives alone which makes him uncomfortable).  I did obtain a baseline hemoglobin which will be helpful to trend during his hospitalization.  I think initial observation admission is reasonable for continued mointoring.  I considered prophylactic antibiotics, but at this time most recent literature suggest these may cause more harm than benefit.  I did use bacitracin around the rapid Rhino packing.  I spoke with Dr. Garcia regarding observation admission who accepted.  The patient will need to follow-up with ENT for packing removal and follow-up in 3 to 5 days.    Diagnosis:    ICD-10-CM   1. Epistaxis R04.0     Disposition: Admission into observation    Scribe Disclosure:  Reginaldo VILLAGRAN, am serving as a scribe at 6:53 PM on 6/7/2019 to document services personally performed by Hilario Blair DO based on my observations and the provider's statements to me.      EMERGENCY DEPARTMENT       Hilario Blair DO  06/07/19 [id number]     ,

## 2022-05-29 ENCOUNTER — LAB REQUISITION (OUTPATIENT)
Dept: LAB | Facility: CLINIC | Age: 87
End: 2022-05-29
Payer: MEDICARE

## 2022-05-29 DIAGNOSIS — D53.9 NUTRITIONAL ANEMIA, UNSPECIFIED: ICD-10-CM

## 2022-06-05 ENCOUNTER — LAB REQUISITION (OUTPATIENT)
Dept: LAB | Facility: CLINIC | Age: 87
End: 2022-06-05
Payer: MEDICARE

## 2022-06-05 DIAGNOSIS — I50.9 HEART FAILURE, UNSPECIFIED (H): ICD-10-CM

## 2022-06-05 DIAGNOSIS — D53.9 NUTRITIONAL ANEMIA, UNSPECIFIED: ICD-10-CM

## 2022-06-07 LAB
ANION GAP SERPL CALCULATED.3IONS-SCNC: 11 MMOL/L (ref 5–18)
BUN SERPL-MCNC: 25 MG/DL (ref 8–28)
CALCIUM SERPL-MCNC: 8.8 MG/DL (ref 8.5–10.5)
CHLORIDE BLD-SCNC: 98 MMOL/L (ref 98–107)
CO2 SERPL-SCNC: 28 MMOL/L (ref 22–31)
CREAT SERPL-MCNC: 1.37 MG/DL (ref 0.7–1.3)
GFR SERPL CREATININE-BSD FRML MDRD: 47 ML/MIN/1.73M2
GLUCOSE BLD-MCNC: 119 MG/DL (ref 70–125)
HGB BLD-MCNC: 11.5 G/DL (ref 13.3–17.7)
POTASSIUM BLD-SCNC: 3.4 MMOL/L (ref 3.5–5)
SODIUM SERPL-SCNC: 137 MMOL/L (ref 136–145)

## 2022-06-07 PROCEDURE — 80048 BASIC METABOLIC PNL TOTAL CA: CPT | Mod: ORL | Performed by: NURSE PRACTITIONER

## 2022-06-07 PROCEDURE — 85018 HEMOGLOBIN: CPT | Mod: ORL | Performed by: NURSE PRACTITIONER

## 2022-06-07 PROCEDURE — 36415 COLL VENOUS BLD VENIPUNCTURE: CPT | Mod: ORL | Performed by: NURSE PRACTITIONER

## 2022-06-07 PROCEDURE — P9604 ONE-WAY ALLOW PRORATED TRIP: HCPCS | Mod: ORL | Performed by: NURSE PRACTITIONER

## 2022-11-06 ENCOUNTER — LAB REQUISITION (OUTPATIENT)
Dept: LAB | Facility: CLINIC | Age: 87
End: 2022-11-06
Payer: MEDICARE

## 2022-11-06 DIAGNOSIS — N18.9 CHRONIC KIDNEY DISEASE, UNSPECIFIED: ICD-10-CM

## 2022-11-06 DIAGNOSIS — D53.9 NUTRITIONAL ANEMIA, UNSPECIFIED: ICD-10-CM

## 2022-11-08 LAB
ANION GAP SERPL CALCULATED.3IONS-SCNC: 12 MMOL/L (ref 7–15)
BUN SERPL-MCNC: 24.3 MG/DL (ref 8–23)
CALCIUM SERPL-MCNC: 8 MG/DL (ref 8.2–9.6)
CHLORIDE SERPL-SCNC: 101 MMOL/L (ref 98–107)
CREAT SERPL-MCNC: 1.23 MG/DL (ref 0.67–1.17)
DEPRECATED HCO3 PLAS-SCNC: 27 MMOL/L (ref 22–29)
GFR SERPL CREATININE-BSD FRML MDRD: 53 ML/MIN/1.73M2
GLUCOSE SERPL-MCNC: 130 MG/DL (ref 70–99)
POTASSIUM SERPL-SCNC: 3.7 MMOL/L (ref 3.4–5.3)
SODIUM SERPL-SCNC: 140 MMOL/L (ref 136–145)

## 2022-11-08 PROCEDURE — 80048 BASIC METABOLIC PNL TOTAL CA: CPT | Mod: ORL | Performed by: NURSE PRACTITIONER

## 2022-11-08 PROCEDURE — 85027 COMPLETE CBC AUTOMATED: CPT | Mod: ORL | Performed by: NURSE PRACTITIONER

## 2022-11-08 PROCEDURE — P9604 ONE-WAY ALLOW PRORATED TRIP: HCPCS | Mod: ORL | Performed by: NURSE PRACTITIONER

## 2022-11-08 PROCEDURE — 36415 COLL VENOUS BLD VENIPUNCTURE: CPT | Mod: ORL | Performed by: NURSE PRACTITIONER

## 2022-11-09 LAB
ERYTHROCYTE [DISTWIDTH] IN BLOOD BY AUTOMATED COUNT: 13.6 % (ref 10–15)
HCT VFR BLD AUTO: 41.5 % (ref 40–53)
HGB BLD-MCNC: 13.3 G/DL (ref 13.3–17.7)
MCH RBC QN AUTO: 32 PG (ref 26.5–33)
MCHC RBC AUTO-ENTMCNC: 32 G/DL (ref 31.5–36.5)
MCV RBC AUTO: 100 FL (ref 78–100)
PLATELET # BLD AUTO: 191 10E3/UL (ref 150–450)
RBC # BLD AUTO: 4.15 10E6/UL (ref 4.4–5.9)
WBC # BLD AUTO: 5.2 10E3/UL (ref 4–11)

## 2022-11-14 ENCOUNTER — LAB REQUISITION (OUTPATIENT)
Dept: LAB | Facility: CLINIC | Age: 87
End: 2022-11-14
Payer: MEDICARE

## 2022-11-14 DIAGNOSIS — M10.9 GOUT, UNSPECIFIED: ICD-10-CM

## 2022-11-17 LAB
ERYTHROCYTE [SEDIMENTATION RATE] IN BLOOD BY WESTERGREN METHOD: 12 MM/HR (ref 0–20)
URATE SERPL-MCNC: 5.2 MG/DL (ref 3.4–7)
WBC # BLD AUTO: 5 10E3/UL (ref 4–11)

## 2022-11-17 PROCEDURE — P9604 ONE-WAY ALLOW PRORATED TRIP: HCPCS | Mod: ORL | Performed by: NURSE PRACTITIONER

## 2022-11-17 PROCEDURE — 85652 RBC SED RATE AUTOMATED: CPT | Mod: ORL | Performed by: NURSE PRACTITIONER

## 2022-11-17 PROCEDURE — 36415 COLL VENOUS BLD VENIPUNCTURE: CPT | Mod: ORL | Performed by: NURSE PRACTITIONER

## 2022-11-17 PROCEDURE — 85048 AUTOMATED LEUKOCYTE COUNT: CPT | Mod: ORL | Performed by: NURSE PRACTITIONER

## 2022-11-17 PROCEDURE — 84550 ASSAY OF BLOOD/URIC ACID: CPT | Mod: ORL | Performed by: NURSE PRACTITIONER

## 2022-12-11 ENCOUNTER — LAB REQUISITION (OUTPATIENT)
Dept: LAB | Facility: CLINIC | Age: 87
End: 2022-12-11
Payer: MEDICARE

## 2022-12-11 DIAGNOSIS — D53.9 NUTRITIONAL ANEMIA, UNSPECIFIED: ICD-10-CM

## 2022-12-11 DIAGNOSIS — N18.9 CHRONIC KIDNEY DISEASE, UNSPECIFIED: ICD-10-CM

## 2023-01-30 ENCOUNTER — LAB REQUISITION (OUTPATIENT)
Dept: LAB | Facility: CLINIC | Age: 88
End: 2023-01-30
Payer: MEDICARE

## 2023-01-30 DIAGNOSIS — D53.9 NUTRITIONAL ANEMIA, UNSPECIFIED: ICD-10-CM

## 2023-01-31 ENCOUNTER — LAB REQUISITION (OUTPATIENT)
Dept: LAB | Facility: CLINIC | Age: 88
End: 2023-01-31
Payer: MEDICARE

## 2023-01-31 DIAGNOSIS — D53.9 NUTRITIONAL ANEMIA, UNSPECIFIED: ICD-10-CM

## 2023-01-31 LAB — INR PPP: 1 (ref 0.85–1.15)

## 2023-01-31 PROCEDURE — 36415 COLL VENOUS BLD VENIPUNCTURE: CPT | Mod: ORL | Performed by: NURSE PRACTITIONER

## 2023-01-31 PROCEDURE — 85610 PROTHROMBIN TIME: CPT | Mod: ORL | Performed by: NURSE PRACTITIONER

## 2023-01-31 PROCEDURE — P9604 ONE-WAY ALLOW PRORATED TRIP: HCPCS | Mod: ORL | Performed by: NURSE PRACTITIONER

## 2023-02-01 LAB — HGB BLD-MCNC: 12.8 G/DL (ref 13.3–17.7)

## 2023-02-01 PROCEDURE — P9604 ONE-WAY ALLOW PRORATED TRIP: HCPCS | Mod: ORL | Performed by: NURSE PRACTITIONER

## 2023-02-01 PROCEDURE — 36415 COLL VENOUS BLD VENIPUNCTURE: CPT | Mod: ORL | Performed by: NURSE PRACTITIONER

## 2023-02-01 PROCEDURE — 85018 HEMOGLOBIN: CPT | Mod: ORL | Performed by: NURSE PRACTITIONER

## 2023-06-03 ENCOUNTER — LAB REQUISITION (OUTPATIENT)
Dept: LAB | Facility: CLINIC | Age: 88
End: 2023-06-03
Payer: MEDICARE

## 2023-06-03 DIAGNOSIS — N18.9 CHRONIC KIDNEY DISEASE, UNSPECIFIED: ICD-10-CM

## 2023-06-07 ENCOUNTER — LAB REQUISITION (OUTPATIENT)
Dept: LAB | Facility: CLINIC | Age: 88
End: 2023-06-07
Payer: MEDICARE

## 2023-06-07 DIAGNOSIS — N18.9 CHRONIC KIDNEY DISEASE, UNSPECIFIED: ICD-10-CM

## 2023-06-07 PROCEDURE — P9604 ONE-WAY ALLOW PRORATED TRIP: HCPCS | Mod: ORL | Performed by: NURSE PRACTITIONER

## 2023-06-08 LAB
ANION GAP SERPL CALCULATED.3IONS-SCNC: 10 MMOL/L (ref 7–15)
BUN SERPL-MCNC: 35.3 MG/DL (ref 8–23)
CALCIUM SERPL-MCNC: 8.5 MG/DL (ref 8.2–9.6)
CHLORIDE SERPL-SCNC: 105 MMOL/L (ref 98–107)
CREAT SERPL-MCNC: 1.24 MG/DL (ref 0.67–1.17)
DEPRECATED HCO3 PLAS-SCNC: 23 MMOL/L (ref 22–29)
GFR SERPL CREATININE-BSD FRML MDRD: 53 ML/MIN/1.73M2
GLUCOSE SERPL-MCNC: 104 MG/DL (ref 70–99)
POTASSIUM SERPL-SCNC: 4.5 MMOL/L (ref 3.4–5.3)
SODIUM SERPL-SCNC: 138 MMOL/L (ref 136–145)

## 2023-06-08 PROCEDURE — 80048 BASIC METABOLIC PNL TOTAL CA: CPT | Mod: ORL | Performed by: NURSE PRACTITIONER

## 2023-06-08 PROCEDURE — P9603 ONE-WAY ALLOW PRORATED MILES: HCPCS | Mod: ORL | Performed by: NURSE PRACTITIONER

## 2023-06-08 PROCEDURE — 36415 COLL VENOUS BLD VENIPUNCTURE: CPT | Mod: ORL | Performed by: NURSE PRACTITIONER

## 2023-10-30 ENCOUNTER — LAB REQUISITION (OUTPATIENT)
Dept: LAB | Facility: CLINIC | Age: 88
End: 2023-10-30
Payer: MEDICARE

## 2023-10-30 DIAGNOSIS — I50.9 HEART FAILURE, UNSPECIFIED (H): ICD-10-CM

## 2023-10-30 DIAGNOSIS — U07.1 COVID-19: ICD-10-CM

## 2023-10-31 LAB
ANION GAP SERPL CALCULATED.3IONS-SCNC: 10 MMOL/L (ref 7–15)
BUN SERPL-MCNC: 30.7 MG/DL (ref 8–23)
CALCIUM SERPL-MCNC: 8.8 MG/DL (ref 8.2–9.6)
CHLORIDE SERPL-SCNC: 101 MMOL/L (ref 98–107)
CREAT SERPL-MCNC: 1.59 MG/DL (ref 0.67–1.17)
DEPRECATED HCO3 PLAS-SCNC: 27 MMOL/L (ref 22–29)
EGFRCR SERPLBLD CKD-EPI 2021: 39 ML/MIN/1.73M2
ERYTHROCYTE [DISTWIDTH] IN BLOOD BY AUTOMATED COUNT: 13.5 % (ref 10–15)
GLUCOSE SERPL-MCNC: 112 MG/DL (ref 70–99)
HCT VFR BLD AUTO: 40.8 % (ref 40–53)
HGB BLD-MCNC: 13 G/DL (ref 13.3–17.7)
MCH RBC QN AUTO: 34.1 PG (ref 26.5–33)
MCHC RBC AUTO-ENTMCNC: 31.9 G/DL (ref 31.5–36.5)
MCV RBC AUTO: 107 FL (ref 78–100)
NT-PROBNP SERPL-MCNC: 375 PG/ML (ref 0–1800)
PLATELET # BLD AUTO: 217 10E3/UL (ref 150–450)
POTASSIUM SERPL-SCNC: 4.2 MMOL/L (ref 3.4–5.3)
RBC # BLD AUTO: 3.81 10E6/UL (ref 4.4–5.9)
SODIUM SERPL-SCNC: 138 MMOL/L (ref 135–145)
WBC # BLD AUTO: 5.1 10E3/UL (ref 4–11)

## 2023-10-31 PROCEDURE — P9604 ONE-WAY ALLOW PRORATED TRIP: HCPCS | Mod: ORL | Performed by: NURSE PRACTITIONER

## 2023-10-31 PROCEDURE — 85027 COMPLETE CBC AUTOMATED: CPT | Mod: ORL | Performed by: NURSE PRACTITIONER

## 2023-10-31 PROCEDURE — 36415 COLL VENOUS BLD VENIPUNCTURE: CPT | Mod: ORL | Performed by: NURSE PRACTITIONER

## 2023-10-31 PROCEDURE — 80048 BASIC METABOLIC PNL TOTAL CA: CPT | Mod: ORL | Performed by: NURSE PRACTITIONER

## 2023-10-31 PROCEDURE — 83880 ASSAY OF NATRIURETIC PEPTIDE: CPT | Mod: ORL | Performed by: NURSE PRACTITIONER

## 2023-11-18 ENCOUNTER — LAB REQUISITION (OUTPATIENT)
Dept: LAB | Facility: CLINIC | Age: 88
End: 2023-11-18
Payer: MEDICARE

## 2023-11-18 DIAGNOSIS — N18.9 CHRONIC KIDNEY DISEASE, UNSPECIFIED: ICD-10-CM

## 2023-11-18 DIAGNOSIS — D53.9 NUTRITIONAL ANEMIA, UNSPECIFIED: ICD-10-CM

## 2023-11-21 LAB
ANION GAP SERPL CALCULATED.3IONS-SCNC: 10 MMOL/L (ref 7–15)
BUN SERPL-MCNC: 21 MG/DL (ref 8–23)
CALCIUM SERPL-MCNC: 8.4 MG/DL (ref 8.2–9.6)
CHLORIDE SERPL-SCNC: 104 MMOL/L (ref 98–107)
CREAT SERPL-MCNC: 1.25 MG/DL (ref 0.67–1.17)
DEPRECATED HCO3 PLAS-SCNC: 24 MMOL/L (ref 22–29)
EGFRCR SERPLBLD CKD-EPI 2021: 52 ML/MIN/1.73M2
GLUCOSE SERPL-MCNC: 93 MG/DL (ref 70–99)
HGB BLD-MCNC: 11.4 G/DL (ref 13.3–17.7)
POTASSIUM SERPL-SCNC: 4.1 MMOL/L (ref 3.4–5.3)
SODIUM SERPL-SCNC: 138 MMOL/L (ref 135–145)
TSH SERPL DL<=0.005 MIU/L-ACNC: 1.42 UIU/ML (ref 0.3–4.2)

## 2023-11-21 PROCEDURE — 80048 BASIC METABOLIC PNL TOTAL CA: CPT | Mod: ORL | Performed by: NURSE PRACTITIONER

## 2023-11-21 PROCEDURE — 85018 HEMOGLOBIN: CPT | Mod: ORL | Performed by: NURSE PRACTITIONER

## 2023-11-21 PROCEDURE — 84443 ASSAY THYROID STIM HORMONE: CPT | Mod: ORL | Performed by: NURSE PRACTITIONER

## 2023-11-21 PROCEDURE — 36415 COLL VENOUS BLD VENIPUNCTURE: CPT | Mod: ORL | Performed by: NURSE PRACTITIONER

## 2023-11-21 PROCEDURE — P9604 ONE-WAY ALLOW PRORATED TRIP: HCPCS | Mod: ORL | Performed by: NURSE PRACTITIONER

## 2024-02-01 ENCOUNTER — LAB REQUISITION (OUTPATIENT)
Dept: LAB | Facility: CLINIC | Age: 89
End: 2024-02-01
Payer: MEDICARE

## 2024-02-01 DIAGNOSIS — D64.9 ANEMIA, UNSPECIFIED: ICD-10-CM

## 2024-02-01 DIAGNOSIS — N18.9 CHRONIC KIDNEY DISEASE, UNSPECIFIED: ICD-10-CM

## 2024-02-02 LAB
ANION GAP SERPL CALCULATED.3IONS-SCNC: 6 MMOL/L (ref 7–15)
BUN SERPL-MCNC: 18.1 MG/DL (ref 8–23)
CALCIUM SERPL-MCNC: 8.2 MG/DL (ref 8.2–9.6)
CHLORIDE SERPL-SCNC: 101 MMOL/L (ref 98–107)
CREAT SERPL-MCNC: 1.17 MG/DL (ref 0.67–1.17)
DEPRECATED HCO3 PLAS-SCNC: 30 MMOL/L (ref 22–29)
EGFRCR SERPLBLD CKD-EPI 2021: 56 ML/MIN/1.73M2
GLUCOSE SERPL-MCNC: 129 MG/DL (ref 70–99)
HGB BLD-MCNC: 12.2 G/DL (ref 13.3–17.7)
POTASSIUM SERPL-SCNC: 4.3 MMOL/L (ref 3.4–5.3)
SODIUM SERPL-SCNC: 137 MMOL/L (ref 135–145)

## 2024-02-02 PROCEDURE — 80048 BASIC METABOLIC PNL TOTAL CA: CPT | Performed by: NURSE PRACTITIONER

## 2024-02-02 PROCEDURE — 36415 COLL VENOUS BLD VENIPUNCTURE: CPT | Performed by: NURSE PRACTITIONER

## 2024-02-02 PROCEDURE — P9604 ONE-WAY ALLOW PRORATED TRIP: HCPCS | Performed by: NURSE PRACTITIONER

## 2024-02-02 PROCEDURE — 85018 HEMOGLOBIN: CPT | Performed by: NURSE PRACTITIONER

## 2024-02-06 ENCOUNTER — LAB REQUISITION (OUTPATIENT)
Dept: LAB | Facility: CLINIC | Age: 89
End: 2024-02-06

## 2024-02-06 DIAGNOSIS — D64.9 ANEMIA, UNSPECIFIED: ICD-10-CM

## 2024-02-07 ENCOUNTER — LAB REQUISITION (OUTPATIENT)
Dept: LAB | Facility: CLINIC | Age: 89
End: 2024-02-07
Payer: MEDICARE

## 2024-02-07 DIAGNOSIS — D64.9 ANEMIA, UNSPECIFIED: ICD-10-CM

## 2024-02-07 LAB — HGB BLD-MCNC: 14.1 G/DL (ref 13.3–17.7)

## 2024-02-07 PROCEDURE — 85018 HEMOGLOBIN: CPT | Mod: ORL | Performed by: NURSE PRACTITIONER

## 2024-02-08 ENCOUNTER — LAB REQUISITION (OUTPATIENT)
Dept: LAB | Facility: CLINIC | Age: 89
End: 2024-02-08

## 2024-02-08 DIAGNOSIS — R35.0 FREQUENCY OF MICTURITION: ICD-10-CM

## 2024-02-08 LAB
ALBUMIN UR-MCNC: NEGATIVE MG/DL
APPEARANCE UR: CLEAR
BILIRUB UR QL STRIP: NEGATIVE
COLOR UR AUTO: YELLOW
GLUCOSE UR STRIP-MCNC: NEGATIVE MG/DL
HGB UR QL STRIP: NEGATIVE
HYALINE CASTS: 4 /LPF
KETONES UR STRIP-MCNC: NEGATIVE MG/DL
LEUKOCYTE ESTERASE UR QL STRIP: NEGATIVE
MUCOUS THREADS #/AREA URNS LPF: PRESENT /LPF
NITRATE UR QL: NEGATIVE
PH UR STRIP: 5.5 [PH] (ref 5–7)
RBC URINE: 0 /HPF
SP GR UR STRIP: 1.03 (ref 1–1.03)
SQUAMOUS EPITHELIAL: <1 /HPF
UROBILINOGEN UR STRIP-MCNC: NORMAL MG/DL
WBC URINE: 0 /HPF

## 2024-02-08 PROCEDURE — 81001 URINALYSIS AUTO W/SCOPE: CPT | Performed by: INTERNAL MEDICINE

## 2024-02-13 ENCOUNTER — LAB REQUISITION (OUTPATIENT)
Dept: LAB | Facility: CLINIC | Age: 89
End: 2024-02-13

## 2024-02-13 DIAGNOSIS — R19.7 DIARRHEA, UNSPECIFIED: ICD-10-CM

## 2024-02-13 LAB — C DIFF TOX B STL QL: NEGATIVE

## 2024-02-13 PROCEDURE — 87493 C DIFF AMPLIFIED PROBE: CPT | Performed by: NURSE PRACTITIONER

## 2024-02-15 ENCOUNTER — LAB REQUISITION (OUTPATIENT)
Dept: LAB | Facility: CLINIC | Age: 89
End: 2024-02-15
Payer: MEDICARE

## 2024-02-15 DIAGNOSIS — R19.7 DIARRHEA, UNSPECIFIED: ICD-10-CM

## 2024-02-15 DIAGNOSIS — N18.9 CHRONIC KIDNEY DISEASE, UNSPECIFIED: ICD-10-CM

## 2024-02-16 LAB
ANION GAP SERPL CALCULATED.3IONS-SCNC: 9 MMOL/L (ref 7–15)
BUN SERPL-MCNC: 28 MG/DL (ref 8–23)
CALCIUM SERPL-MCNC: 8.3 MG/DL (ref 8.2–9.6)
CHLORIDE SERPL-SCNC: 105 MMOL/L (ref 98–107)
CREAT SERPL-MCNC: 1.4 MG/DL (ref 0.67–1.17)
DEPRECATED HCO3 PLAS-SCNC: 24 MMOL/L (ref 22–29)
EGFRCR SERPLBLD CKD-EPI 2021: 45 ML/MIN/1.73M2
GLUCOSE SERPL-MCNC: 88 MG/DL (ref 70–99)
POTASSIUM SERPL-SCNC: 4.8 MMOL/L (ref 3.4–5.3)
SODIUM SERPL-SCNC: 138 MMOL/L (ref 135–145)

## 2024-02-16 PROCEDURE — 36415 COLL VENOUS BLD VENIPUNCTURE: CPT | Performed by: NURSE PRACTITIONER

## 2024-02-16 PROCEDURE — 80048 BASIC METABOLIC PNL TOTAL CA: CPT | Performed by: NURSE PRACTITIONER

## 2024-02-16 PROCEDURE — P9603 ONE-WAY ALLOW PRORATED MILES: HCPCS | Performed by: NURSE PRACTITIONER

## 2024-02-26 ENCOUNTER — LAB REQUISITION (OUTPATIENT)
Dept: LAB | Facility: CLINIC | Age: 89
End: 2024-02-26
Payer: MEDICARE

## 2024-02-26 DIAGNOSIS — N18.30 CHRONIC KIDNEY DISEASE, STAGE 3 UNSPECIFIED (H): ICD-10-CM

## 2024-02-27 LAB
ANION GAP SERPL CALCULATED.3IONS-SCNC: 14 MMOL/L (ref 7–15)
BUN SERPL-MCNC: 20 MG/DL (ref 8–23)
CALCIUM SERPL-MCNC: 8.6 MG/DL (ref 8.2–9.6)
CHLORIDE SERPL-SCNC: 105 MMOL/L (ref 98–107)
CREAT SERPL-MCNC: 1.27 MG/DL (ref 0.67–1.17)
DEPRECATED HCO3 PLAS-SCNC: 22 MMOL/L (ref 22–29)
EGFRCR SERPLBLD CKD-EPI 2021: 51 ML/MIN/1.73M2
GLUCOSE SERPL-MCNC: 90 MG/DL (ref 70–99)
POTASSIUM SERPL-SCNC: 3.8 MMOL/L (ref 3.4–5.3)
SODIUM SERPL-SCNC: 141 MMOL/L (ref 135–145)

## 2024-02-27 PROCEDURE — 36415 COLL VENOUS BLD VENIPUNCTURE: CPT | Mod: ORL | Performed by: NURSE PRACTITIONER

## 2024-02-27 PROCEDURE — 80048 BASIC METABOLIC PNL TOTAL CA: CPT | Mod: ORL | Performed by: NURSE PRACTITIONER

## 2024-02-27 PROCEDURE — P9604 ONE-WAY ALLOW PRORATED TRIP: HCPCS | Mod: ORL | Performed by: NURSE PRACTITIONER

## 2024-06-02 ENCOUNTER — LAB REQUISITION (OUTPATIENT)
Dept: LAB | Facility: CLINIC | Age: 89
End: 2024-06-02
Payer: MEDICARE

## 2024-06-02 DIAGNOSIS — K92.1 MELENA: ICD-10-CM

## 2024-06-02 DIAGNOSIS — I10 ESSENTIAL (PRIMARY) HYPERTENSION: ICD-10-CM

## 2024-06-04 PROCEDURE — P9604 ONE-WAY ALLOW PRORATED TRIP: HCPCS | Mod: ORL | Performed by: HOSPITALIST

## 2024-06-04 PROCEDURE — 85025 COMPLETE CBC W/AUTO DIFF WBC: CPT | Mod: ORL | Performed by: HOSPITALIST

## 2024-06-04 PROCEDURE — 36415 COLL VENOUS BLD VENIPUNCTURE: CPT | Mod: ORL | Performed by: HOSPITALIST

## 2024-06-05 LAB
BASOPHILS # BLD AUTO: 0.1 10E3/UL (ref 0–0.2)
BASOPHILS NFR BLD AUTO: 1 %
EOSINOPHIL # BLD AUTO: 0.2 10E3/UL (ref 0–0.7)
EOSINOPHIL NFR BLD AUTO: 4 %
ERYTHROCYTE [DISTWIDTH] IN BLOOD BY AUTOMATED COUNT: 13 % (ref 10–15)
HCT VFR BLD AUTO: 36.8 % (ref 40–53)
HGB BLD-MCNC: 11.6 G/DL (ref 13.3–17.7)
IMM GRANULOCYTES # BLD: 0 10E3/UL
IMM GRANULOCYTES NFR BLD: 0 %
LYMPHOCYTES # BLD AUTO: 1.3 10E3/UL (ref 0.8–5.3)
LYMPHOCYTES NFR BLD AUTO: 27 %
MCH RBC QN AUTO: 33.8 PG (ref 26.5–33)
MCHC RBC AUTO-ENTMCNC: 31.5 G/DL (ref 31.5–36.5)
MCV RBC AUTO: 107 FL (ref 78–100)
MONOCYTES # BLD AUTO: 0.6 10E3/UL (ref 0–1.3)
MONOCYTES NFR BLD AUTO: 12 %
NEUTROPHILS # BLD AUTO: 2.7 10E3/UL (ref 1.6–8.3)
NEUTROPHILS NFR BLD AUTO: 56 %
NRBC # BLD AUTO: 0 10E3/UL
NRBC BLD AUTO-RTO: 0 /100
PLATELET # BLD AUTO: 243 10E3/UL (ref 150–450)
RBC # BLD AUTO: 3.43 10E6/UL (ref 4.4–5.9)
WBC # BLD AUTO: 4.9 10E3/UL (ref 4–11)

## 2024-07-20 ENCOUNTER — LAB REQUISITION (OUTPATIENT)
Dept: LAB | Facility: CLINIC | Age: 89
End: 2024-07-20
Payer: MEDICARE

## 2024-07-20 DIAGNOSIS — N18.9 CHRONIC KIDNEY DISEASE, UNSPECIFIED: ICD-10-CM

## 2024-07-20 DIAGNOSIS — D53.9 NUTRITIONAL ANEMIA, UNSPECIFIED: ICD-10-CM

## 2024-07-23 LAB
ANION GAP SERPL CALCULATED.3IONS-SCNC: 11 MMOL/L (ref 7–15)
BUN SERPL-MCNC: 30.6 MG/DL (ref 8–23)
CALCIUM SERPL-MCNC: 9 MG/DL (ref 8.8–10.4)
CHLORIDE SERPL-SCNC: 100 MMOL/L (ref 98–107)
CREAT SERPL-MCNC: 1.38 MG/DL (ref 0.67–1.17)
EGFRCR SERPLBLD CKD-EPI 2021: 46 ML/MIN/1.73M2
GLUCOSE SERPL-MCNC: 98 MG/DL (ref 70–99)
HCO3 SERPL-SCNC: 28 MMOL/L (ref 22–29)
HGB BLD-MCNC: 12.6 G/DL (ref 13.3–17.7)
POTASSIUM SERPL-SCNC: 4.2 MMOL/L (ref 3.4–5.3)
SODIUM SERPL-SCNC: 139 MMOL/L (ref 135–145)

## 2024-07-23 PROCEDURE — 80048 BASIC METABOLIC PNL TOTAL CA: CPT | Mod: ORL | Performed by: NURSE PRACTITIONER

## 2024-07-23 PROCEDURE — P9604 ONE-WAY ALLOW PRORATED TRIP: HCPCS | Mod: ORL | Performed by: NURSE PRACTITIONER

## 2024-07-23 PROCEDURE — 36415 COLL VENOUS BLD VENIPUNCTURE: CPT | Mod: ORL | Performed by: NURSE PRACTITIONER

## 2024-07-23 PROCEDURE — 85018 HEMOGLOBIN: CPT | Mod: ORL | Performed by: NURSE PRACTITIONER

## 2025-01-22 ENCOUNTER — LAB REQUISITION (OUTPATIENT)
Dept: LAB | Facility: CLINIC | Age: OVER 89
End: 2025-01-22
Payer: MEDICARE

## 2025-01-22 DIAGNOSIS — D75.89 OTHER SPECIFIED DISEASES OF BLOOD AND BLOOD-FORMING ORGANS: ICD-10-CM

## 2025-01-22 DIAGNOSIS — D53.9 NUTRITIONAL ANEMIA, UNSPECIFIED: ICD-10-CM

## 2025-01-22 DIAGNOSIS — N18.9 CHRONIC KIDNEY DISEASE, UNSPECIFIED: ICD-10-CM

## 2025-01-28 LAB
ANION GAP SERPL CALCULATED.3IONS-SCNC: 16 MMOL/L (ref 7–15)
BUN SERPL-MCNC: 16.2 MG/DL (ref 8–23)
CALCIUM SERPL-MCNC: 9 MG/DL (ref 8.8–10.4)
CHLORIDE SERPL-SCNC: 99 MMOL/L (ref 98–107)
CREAT SERPL-MCNC: 1.23 MG/DL (ref 0.67–1.17)
EGFRCR SERPLBLD CKD-EPI 2021: 53 ML/MIN/1.73M2
GLUCOSE SERPL-MCNC: 107 MG/DL (ref 70–99)
HCO3 SERPL-SCNC: 23 MMOL/L (ref 22–29)
HGB BLD-MCNC: 12.6 G/DL (ref 13.3–17.7)
POTASSIUM SERPL-SCNC: 3.1 MMOL/L (ref 3.4–5.3)
SODIUM SERPL-SCNC: 138 MMOL/L (ref 135–145)
TSH SERPL DL<=0.005 MIU/L-ACNC: 1.08 UIU/ML (ref 0.3–4.2)

## 2025-02-02 ENCOUNTER — LAB REQUISITION (OUTPATIENT)
Dept: LAB | Facility: CLINIC | Age: OVER 89
End: 2025-02-02
Payer: MEDICARE

## 2025-02-02 DIAGNOSIS — E87.6 HYPOKALEMIA: ICD-10-CM

## 2025-02-03 ENCOUNTER — LAB REQUISITION (OUTPATIENT)
Dept: LAB | Facility: CLINIC | Age: OVER 89
End: 2025-02-03
Payer: MEDICARE

## 2025-02-03 DIAGNOSIS — D50.0 IRON DEFICIENCY ANEMIA SECONDARY TO BLOOD LOSS (CHRONIC): ICD-10-CM

## 2025-02-04 LAB
HGB BLD-MCNC: 10.9 G/DL (ref 13.3–17.7)
POTASSIUM SERPL-SCNC: 3.9 MMOL/L (ref 3.4–5.3)

## 2025-03-03 ENCOUNTER — LAB REQUISITION (OUTPATIENT)
Dept: LAB | Facility: CLINIC | Age: OVER 89
End: 2025-03-03
Payer: MEDICARE

## 2025-03-03 DIAGNOSIS — N18.30 CHRONIC KIDNEY DISEASE, STAGE 3 UNSPECIFIED (H): ICD-10-CM

## 2025-03-04 LAB
ANION GAP SERPL CALCULATED.3IONS-SCNC: 8 MMOL/L (ref 7–15)
BUN SERPL-MCNC: 20.5 MG/DL (ref 8–23)
CALCIUM SERPL-MCNC: 9.2 MG/DL (ref 8.8–10.4)
CHLORIDE SERPL-SCNC: 107 MMOL/L (ref 98–107)
CREAT SERPL-MCNC: 1.1 MG/DL (ref 0.67–1.17)
EGFRCR SERPLBLD CKD-EPI 2021: 60 ML/MIN/1.73M2
GLUCOSE SERPL-MCNC: 97 MG/DL (ref 70–99)
HCO3 SERPL-SCNC: 23 MMOL/L (ref 22–29)
POTASSIUM SERPL-SCNC: 4.5 MMOL/L (ref 3.4–5.3)
SODIUM SERPL-SCNC: 138 MMOL/L (ref 135–145)

## 2025-03-04 PROCEDURE — P9604 ONE-WAY ALLOW PRORATED TRIP: HCPCS | Mod: ORL | Performed by: NURSE PRACTITIONER

## 2025-03-04 PROCEDURE — 36415 COLL VENOUS BLD VENIPUNCTURE: CPT | Mod: ORL | Performed by: NURSE PRACTITIONER

## 2025-03-04 PROCEDURE — 80048 BASIC METABOLIC PNL TOTAL CA: CPT | Mod: ORL | Performed by: NURSE PRACTITIONER

## 2025-04-04 NOTE — DISCHARGE SUMMARY
[facility]  Discharge Summary        Martir Morris MRN# [id number]   YOB: 1925 Age: 89 year old     Date of Admission:  6/7/2019  Date of Discharge:  6/8/2019  Admitting Physician:  Denver Garcia MD  Discharge Physician: Joyce Hauser PA-C  Discharging Service: Hospitalist            Discharge Diagnoses/Problem Oriented Hospital Course (Providers):    Mr. Martir Morris is a 89-year-old gentleman with history including hypertension, hyperlipidemia, hx of TIA on Plavix and depression/anxiety, who presented to the ER with a nosebleed.  The patient noted a significant nosebleed coming from his left naris.  For 30 minutes he had uncontrolled bleeding and at that point he called paramedics.  They put a nasal clamp and he was brought to [facility] for further evaluation. In the ER was treated with epinephrine and lidocaine as well as eventually packed with a Rhino Rocket.  The patient was admitted under observation for monitoring and given some mild 'unsteadiness' he had while walking.  During his stay, hemoglobin remianed stable. His unsteadiness improved.  He was seen by physical therapy and cleared for discharge/home safety.  Although ENT did not offiically consult on the patient, the case was discussed over the phone and it was recommended he be discharged with rhino-rocket in place for now and plan for ENT clinic follow up for removal in a few days.       #Epistaxis s/p rhino rocket placement, stable at discharge      PLAN:  -needs to keep rhino rocket in for 3-6 days, ENT advises patient to call on Monday for follow up apt in ENT clinic on 6/11; phone number and clinic info given to the patient and he will call to make arrangements   -continue to hold Plavix until seen by ENT, further restarting of this will depend on ENT   -cont Augmentin until packing removed, one week prescribed         #Unsteadiness-IMPROVED, suspect due to initial acute blood loss from the above.   Hemoglobin WNL (13.7) and stable. And physical therapy had no concerns with him discharging.      #Benign essential hypertension.  Continue amlodipine and metoprolol.      #Hyperlipidemia.  Resume statin at home.      #Prostate cancer.  Continue finasteride.  Resume leuprolide outpatient.     #Depression/anxiety.  Continue bupropion.     #TIA history.  Hold Plavix for now as above; to be restarted once OK'ed by ENT    #Dispo: to home today   -  Joyce Hauser PA-C ([place])   Hospitalist  Pager: [phone]  12:15 PM  June 8, 2019            Code Status:      DNR / DNI        Discharge Instructions and Follow-Up:         [facility] 6[facility]10/2019 for packing removal      [address] , [address]   [facility] [address] [phone]               Discharge Disposition:      Discharged to home        Discharge Medications:        Current Discharge Medication List      CONTINUE these medications which have NOT CHANGED    Details   amLODIPine (NORVASC) 5 MG tablet Take 5 mg by mouth daily      atorvastatin (LIPITOR) 10 MG tablet Take 10 mg by mouth      buPROPion (WELLBUTRIN SR) 200 MG 12 hr tablet Take 200 mg by mouth daily       Cholecalciferol (VITAMIN D) 1000 UNITS capsule Take 1 capsule by mouth daily.      clopidogrel (PLAVIX) 75 MG tablet Take 75 mg by mouth daily      ferrous sulfate (FEROSUL) 325 (65 Fe) MG tablet Take 325 mg by mouth daily (with breakfast)      finasteride (PROSCAR) 5 MG tablet Take 5 mg by mouth daily      fluticasone (FLONASE) 50 MCG/ACT nasal spray Spray 1 spray into both nostrils daily as needed for rhinitis or allergies      leuprolide (LUPRON DEPOT) 30 MG kit Inject 30 mg into the muscle every 4 months      metoprolol succinate ER (TOPROL-XL) 50 MG 24 hr tablet Take 50 mg by mouth daily      potassium chloride ER (K-DUR/KLOR-CON M) 10 MEQ CR tablet Take 10 mEq by mouth daily      ranitidine (ZANTAC) 150 MG tablet Take 150 mg by mouth daily as needed for  "heartburn       simethicone (MYLICON) 80 MG chewable tablet Take 80 mg by mouth 4 times daily as needed for flatulence or cramping               Allergies:         Allergies   Allergen Reactions     Horse-Derived Products      No Clinical Screening - See Comments Other (See Comments)     Horse Serum Products - Anti Tetnus     Percocet [Oxycodone-Acetaminophen] Other (See Comments)     n & v           Consultations This Hospital Stay:      No consultations were requested during this admission        Condition and Physical on Discharge:      Discharge condition: Stable   Vitals: Blood pressure 156/86, pulse 77, temperature 97.2  F (36.2  C), temperature source Oral, resp. rate 16, height 1.727 m (5' 8\"), weight 103 kg (227 lb), SpO2 95 %.    GENERAL:  Alert and oriented, pleasant 89-year-old male patient who appears comfortable lying in bed.  Conversant and friendly.  He has nasal packing in place.   HEENT:  Pupils equal, round, reactive.  There is no scleral icterus or conjunctival injection.  Nose has nasal packing in place.  Oropharynx:  Some signs of blood in the oropharynx.   NECK:  No bruits, JVD or adenopathy.   HEART:  Regular rate and rhythm without murmurs, rubs, or gallops.   LUNGS:  Diminished in bases without crackles or wheezes.   ABDOMEN:  Soft, nontender, nondistended, positive bowel sounds.   EXTREMITIES:  Show bilateral 1+ pitting edema (patient says that this is stable).   NEUROLOGIC:  Exam reveals no gross focal motor or sensory deficits          Discharge Time:       Greater than 30 minutes.        Image Results From This Hospital Stay (For Non-EPIC Providers):        Results for orders placed or performed in visit on 12/05/17   XR Toe Left G/E 2 Views    Narrative    TOE(S) TWO-THREE VIEWS LEFT  12/5/2017 9:12 PM     HISTORY: Patient complains of left 3rd toe pain since stubbing toe  earlier today. Toe injury, left, initial encounter.    COMPARISON: None.      Impression    IMPRESSION: Probable " osteopenia. No definite displaced fracture  demonstrated. No dislocation.    MASON CHIN MD           Most Recent Lab Results In EPIC (For Non-EPIC Providers):    Most Recent 3 CBC's:  Recent Labs   Lab Test 06/07/19  2300 06/07/19 2000   WBC 5.8 5.7   HGB 13.5 14.5   * 100    200      Most Recent 3 BMP's:  Recent Labs   Lab Test 06/08/19  0605      POTASSIUM 4.6   CHLORIDE 109   CO2 27   BUN 26   CR 1.16   ANIONGAP 5   SHARATH 9.1   *     Most Recent 3 Troponin's:No lab results found.    Invalid input(s): TROP, TROPONINIES  Most Recent 3 INR's:No lab results found.  Most Recent 2 LFT's:No lab results found.  Most Recent Cholesterol Panel:No lab results found.  Most Recent 6 Bacteria Isolates From Any Culture (See EPIC Reports for Culture Details):No lab results found.  Most Recent TSH, T4 and HgbA1c: No lab results found.            - prior Echo done 5/6/24 LVEF 61%. new tremor, unsteadiness with walking   -CT head and neck with no acute pathology  - neuro consulted, Dr. Tran, f/u recs new tremor, unsteadiness with walking   -CT head and neck with no acute pathology  - AM TSH ordered  - neuro consulted, Dr. Tran, f/u recs increased fatigue, unsteadiness when walking, and tremor x 1 week  -CT head and neck with no acute pathology  - AM TSH ordered  - non con MRI head ordered   - neuro consulted, Dr. Tran, f/u recs patient with increased fatigue, unsteadiness when walking, and tremor x 1 week. FHx parkinsons disease   - per ed provider patient noted to be leaning to left side when walking   -CT head and neck with no acute pathology  - AM TSH ordered  - non con MRI head ordered   - neuro consulted, Dr. Tran, f/u recs